# Patient Record
Sex: FEMALE | Race: BLACK OR AFRICAN AMERICAN | NOT HISPANIC OR LATINO | Employment: OTHER | URBAN - METROPOLITAN AREA
[De-identification: names, ages, dates, MRNs, and addresses within clinical notes are randomized per-mention and may not be internally consistent; named-entity substitution may affect disease eponyms.]

---

## 2023-02-09 ENCOUNTER — HOSPITAL ENCOUNTER (OUTPATIENT)
Dept: RADIOLOGY | Facility: HOSPITAL | Age: 43
Discharge: HOME | End: 2023-02-09
Attending: FAMILY MEDICINE
Payer: MEDICARE

## 2023-02-09 DIAGNOSIS — Z12.31 ENCOUNTER FOR SCREENING MAMMOGRAM FOR BREAST CANCER: ICD-10-CM

## 2023-02-09 PROCEDURE — 77067 SCR MAMMO BI INCL CAD: CPT | Mod: TC

## 2023-02-09 PROCEDURE — 77063 MAMMO DIGITAL SCREENING BILAT WITH TOMO: ICD-10-PCS | Mod: 26,,, | Performed by: STUDENT IN AN ORGANIZED HEALTH CARE EDUCATION/TRAINING PROGRAM

## 2023-02-09 PROCEDURE — 77067 SCR MAMMO BI INCL CAD: CPT | Mod: 26,,, | Performed by: STUDENT IN AN ORGANIZED HEALTH CARE EDUCATION/TRAINING PROGRAM

## 2023-02-09 PROCEDURE — 77067 MAMMO DIGITAL SCREENING BILAT WITH TOMO: ICD-10-PCS | Mod: 26,,, | Performed by: STUDENT IN AN ORGANIZED HEALTH CARE EDUCATION/TRAINING PROGRAM

## 2023-02-09 PROCEDURE — 77063 BREAST TOMOSYNTHESIS BI: CPT | Mod: 26,,, | Performed by: STUDENT IN AN ORGANIZED HEALTH CARE EDUCATION/TRAINING PROGRAM

## 2024-01-30 DIAGNOSIS — Z12.31 ENCOUNTER FOR SCREENING MAMMOGRAM FOR BREAST CANCER: Primary | ICD-10-CM

## 2024-03-28 ENCOUNTER — HOSPITAL ENCOUNTER (OUTPATIENT)
Dept: RADIOLOGY | Facility: HOSPITAL | Age: 44
Discharge: HOME | End: 2024-03-28
Attending: FAMILY MEDICINE
Payer: MEDICARE

## 2024-03-28 DIAGNOSIS — Z12.31 ENCOUNTER FOR SCREENING MAMMOGRAM FOR BREAST CANCER: ICD-10-CM

## 2024-03-28 PROCEDURE — 77067 SCR MAMMO BI INCL CAD: CPT | Mod: TC

## 2024-03-28 PROCEDURE — 77067 SCR MAMMO BI INCL CAD: CPT | Mod: 26,,, | Performed by: STUDENT IN AN ORGANIZED HEALTH CARE EDUCATION/TRAINING PROGRAM

## 2024-03-28 PROCEDURE — 77063 BREAST TOMOSYNTHESIS BI: CPT | Mod: 26,,, | Performed by: STUDENT IN AN ORGANIZED HEALTH CARE EDUCATION/TRAINING PROGRAM

## 2024-08-16 ENCOUNTER — HOSPITAL ENCOUNTER (OUTPATIENT)
Dept: RADIOLOGY | Facility: HOSPITAL | Age: 44
Discharge: HOME | End: 2024-08-16
Attending: FAMILY MEDICINE
Payer: MEDICARE

## 2024-08-16 DIAGNOSIS — R41.82 ALTERED MENTAL STATUS: ICD-10-CM

## 2024-08-16 PROCEDURE — 71046 X-RAY EXAM CHEST 2 VIEWS: CPT | Mod: TC

## 2024-10-01 ENCOUNTER — HOSPITAL ENCOUNTER (EMERGENCY)
Facility: HOSPITAL | Age: 44
Discharge: PSYCHIATRIC OR SUBSTANCE USE FACILITY | End: 2024-10-02
Attending: STUDENT IN AN ORGANIZED HEALTH CARE EDUCATION/TRAINING PROGRAM
Payer: MEDICARE

## 2024-10-01 DIAGNOSIS — D72.829 LEUKOCYTOSIS: ICD-10-CM

## 2024-10-01 DIAGNOSIS — F29 PSYCHOSIS, UNSPECIFIED PSYCHOSIS TYPE: ICD-10-CM

## 2024-10-01 DIAGNOSIS — Z00.8 MEDICAL CLEARANCE FOR PSYCHIATRIC ADMISSION: Primary | ICD-10-CM

## 2024-10-01 LAB
ALBUMIN SERPL-MCNC: 3.1 G/DL (ref 3.5–5)
ALBUMIN/GLOB SERPL: 0.7 RATIO (ref 1.1–2)
ALP SERPL-CCNC: 126 UNIT/L (ref 40–150)
ALT SERPL-CCNC: 19 UNIT/L (ref 0–55)
AMPHET UR QL SCN: NEGATIVE
ANION GAP SERPL CALC-SCNC: 8 MEQ/L
APAP SERPL-MCNC: <3 UG/ML (ref 10–30)
AST SERPL-CCNC: 22 UNIT/L (ref 5–34)
B-HCG UR QL: NEGATIVE
BACTERIA #/AREA URNS AUTO: ABNORMAL /HPF
BARBITURATE SCN PRESENT UR: NEGATIVE
BASOPHILS # BLD AUTO: 0.1 X10(3)/MCL
BASOPHILS NFR BLD AUTO: 0.6 %
BENZODIAZ UR QL SCN: NEGATIVE
BILIRUB SERPL-MCNC: 0.2 MG/DL
BILIRUB UR QL STRIP.AUTO: NEGATIVE
BUN SERPL-MCNC: 9 MG/DL (ref 7–18.7)
CALCIUM SERPL-MCNC: 9.1 MG/DL (ref 8.4–10.2)
CANNABINOIDS UR QL SCN: NEGATIVE
CHLORIDE SERPL-SCNC: 104 MMOL/L (ref 98–107)
CLARITY UR: ABNORMAL
CO2 SERPL-SCNC: 25 MMOL/L (ref 22–29)
COCAINE UR QL SCN: NEGATIVE
COLOR UR AUTO: ABNORMAL
CREAT SERPL-MCNC: 0.8 MG/DL (ref 0.55–1.02)
CREAT/UREA NIT SERPL: 11
EOSINOPHIL # BLD AUTO: 0.42 X10(3)/MCL (ref 0–0.9)
EOSINOPHIL NFR BLD AUTO: 2.4 %
ERYTHROCYTE [DISTWIDTH] IN BLOOD BY AUTOMATED COUNT: 17.7 % (ref 11.5–17)
ETHANOL SERPL-MCNC: <10 MG/DL
FENTANYL UR QL SCN: POSITIVE
GFR SERPLBLD CREATININE-BSD FMLA CKD-EPI: >60 ML/MIN/1.73/M2
GLOBULIN SER-MCNC: 4.6 GM/DL (ref 2.4–3.5)
GLUCOSE SERPL-MCNC: 106 MG/DL (ref 74–100)
GLUCOSE UR QL STRIP: NEGATIVE
HCT VFR BLD AUTO: 34.4 % (ref 37–47)
HGB BLD-MCNC: 11.1 G/DL (ref 12–16)
HGB UR QL STRIP: ABNORMAL
IMM GRANULOCYTES # BLD AUTO: 0.09 X10(3)/MCL (ref 0–0.04)
IMM GRANULOCYTES NFR BLD AUTO: 0.5 %
KETONES UR QL STRIP: NEGATIVE
LEUKOCYTE ESTERASE UR QL STRIP: NEGATIVE
LYMPHOCYTES # BLD AUTO: 4.49 X10(3)/MCL (ref 0.6–4.6)
LYMPHOCYTES NFR BLD AUTO: 25.8 %
MCH RBC QN AUTO: 26.7 PG (ref 27–31)
MCHC RBC AUTO-ENTMCNC: 32.3 G/DL (ref 33–36)
MCV RBC AUTO: 82.7 FL (ref 80–94)
MDMA UR QL SCN: NEGATIVE
MONOCYTES # BLD AUTO: 0.95 X10(3)/MCL (ref 0.1–1.3)
MONOCYTES NFR BLD AUTO: 5.5 %
NEUTROPHILS # BLD AUTO: 11.32 X10(3)/MCL (ref 2.1–9.2)
NEUTROPHILS NFR BLD AUTO: 65.2 %
NITRITE UR QL STRIP: NEGATIVE
OPIATES UR QL SCN: NEGATIVE
PCP UR QL: NEGATIVE
PH UR STRIP: 5.5 [PH]
PH UR: 5.5 [PH] (ref 3–11)
PLATELET # BLD AUTO: 441 X10(3)/MCL (ref 130–400)
PMV BLD AUTO: 10.3 FL (ref 7.4–10.4)
POTASSIUM SERPL-SCNC: 3.7 MMOL/L (ref 3.5–5.1)
PROT SERPL-MCNC: 7.7 GM/DL (ref 6.4–8.3)
PROT UR QL STRIP: ABNORMAL
RBC # BLD AUTO: 4.16 X10(6)/MCL (ref 4.2–5.4)
RBC #/AREA URNS AUTO: ABNORMAL /HPF
SODIUM SERPL-SCNC: 137 MMOL/L (ref 136–145)
SP GR UR STRIP.AUTO: >=1.03 (ref 1–1.03)
SPECIFIC GRAVITY, URINE AUTO (.000) (OHS): >=1.03 (ref 1–1.03)
SQUAMOUS #/AREA URNS AUTO: ABNORMAL /HPF
TSH SERPL-ACNC: 4.34 UIU/ML (ref 0.35–4.94)
UROBILINOGEN UR STRIP-ACNC: 0.2
WBC # BLD AUTO: 17.37 X10(3)/MCL (ref 4.5–11.5)
WBC #/AREA URNS AUTO: ABNORMAL /HPF

## 2024-10-01 PROCEDURE — 82077 ASSAY SPEC XCP UR&BREATH IA: CPT | Performed by: STUDENT IN AN ORGANIZED HEALTH CARE EDUCATION/TRAINING PROGRAM

## 2024-10-01 PROCEDURE — 99285 EMERGENCY DEPT VISIT HI MDM: CPT | Mod: 25

## 2024-10-01 PROCEDURE — 81003 URINALYSIS AUTO W/O SCOPE: CPT | Performed by: STUDENT IN AN ORGANIZED HEALTH CARE EDUCATION/TRAINING PROGRAM

## 2024-10-01 PROCEDURE — 80307 DRUG TEST PRSMV CHEM ANLYZR: CPT | Performed by: STUDENT IN AN ORGANIZED HEALTH CARE EDUCATION/TRAINING PROGRAM

## 2024-10-01 PROCEDURE — 81001 URINALYSIS AUTO W/SCOPE: CPT | Mod: XB | Performed by: STUDENT IN AN ORGANIZED HEALTH CARE EDUCATION/TRAINING PROGRAM

## 2024-10-01 PROCEDURE — 81025 URINE PREGNANCY TEST: CPT | Performed by: STUDENT IN AN ORGANIZED HEALTH CARE EDUCATION/TRAINING PROGRAM

## 2024-10-01 PROCEDURE — 80143 DRUG ASSAY ACETAMINOPHEN: CPT | Performed by: STUDENT IN AN ORGANIZED HEALTH CARE EDUCATION/TRAINING PROGRAM

## 2024-10-01 PROCEDURE — 84443 ASSAY THYROID STIM HORMONE: CPT | Performed by: STUDENT IN AN ORGANIZED HEALTH CARE EDUCATION/TRAINING PROGRAM

## 2024-10-01 PROCEDURE — 85025 COMPLETE CBC W/AUTO DIFF WBC: CPT | Performed by: STUDENT IN AN ORGANIZED HEALTH CARE EDUCATION/TRAINING PROGRAM

## 2024-10-01 PROCEDURE — 80053 COMPREHEN METABOLIC PANEL: CPT | Performed by: STUDENT IN AN ORGANIZED HEALTH CARE EDUCATION/TRAINING PROGRAM

## 2024-10-01 RX ORDER — HYDROCHLOROTHIAZIDE 25 MG/1
25 TABLET ORAL
COMMUNITY
Start: 2024-07-26

## 2024-10-01 RX ORDER — TRAZODONE HYDROCHLORIDE 150 MG/1
300 TABLET ORAL NIGHTLY
COMMUNITY
Start: 2024-08-15

## 2024-10-01 RX ORDER — CARIPRAZINE 1.5 MG/1
1.5 CAPSULE, GELATIN COATED ORAL DAILY
COMMUNITY
Start: 2024-09-13

## 2024-10-01 RX ORDER — PRAVASTATIN SODIUM 20 MG/1
20 TABLET ORAL NIGHTLY
COMMUNITY
Start: 2024-07-02

## 2024-10-01 RX ORDER — ZIPRASIDONE HYDROCHLORIDE 60 MG/1
140 CAPSULE ORAL NIGHTLY
COMMUNITY
Start: 2024-08-02

## 2024-10-01 RX ORDER — SERTRALINE HYDROCHLORIDE 100 MG/1
200 TABLET, FILM COATED ORAL EVERY MORNING
COMMUNITY
Start: 2024-08-02

## 2024-10-01 NOTE — ED PROVIDER NOTES
Encounter Date: 10/1/2024       History     Chief Complaint   Patient presents with    Mental Health Problem     Needs medication adjustment, talking to herself, leaving the house when she's isn't supposed to. Pt is not acting herself, Dr. Brannon suggest PEC      HPI     44-year-old female with a past medical history developmental delay and schizophrenia presents emergency department for increasing bizarre behavior, running away from home going into abandoned structures and not taking her medication. family concerned that she will end up getting hurt.  PCP informed him to bring her to the emergency department for pec    Review of patient's allergies indicates:   Allergen Reactions    Hydrocodone-acetaminophen      Past Medical History:   Diagnosis Date    Bipolar disorder, unspecified     Depression     Hypercholesteremia     Schizophrenia, unspecified      History reviewed. No pertinent surgical history.  No family history on file.  Social History     Tobacco Use    Smoking status: Never    Smokeless tobacco: Never   Substance Use Topics    Alcohol use: Never    Drug use: Never     Review of Systems   Unable to perform ROS: Psychiatric disorder       Physical Exam     Initial Vitals [10/01/24 1816]   BP Pulse Resp Temp SpO2   (!) 145/85 96 18 96.4 °F (35.8 °C) (!) 94 %      MAP       --         Physical Exam    Nursing note and vitals reviewed.  Constitutional: She appears well-developed and well-nourished. She is Obese . No distress.   Cardiovascular:  Normal rate and regular rhythm.           Pulmonary/Chest: Breath sounds normal. No respiratory distress. She has no wheezes. She has no rhonchi. She has no rales.   Abdominal: Abdomen is soft. There is no abdominal tenderness. There is no rebound and no guarding.   Musculoskeletal:         General: No tenderness. Normal range of motion.     Neurological: She is alert and oriented to person, place, and time. She has normal strength.   Skin: Skin is warm. Capillary  refill takes less than 2 seconds.   Psychiatric: Her speech is tangential. She is hyperactive and actively hallucinating. She expresses impulsivity. She is inattentive.         ED Course   Procedures  Labs Reviewed   COMPREHENSIVE METABOLIC PANEL - Abnormal       Result Value    Sodium 137      Potassium 3.7      Chloride 104      CO2 25      Glucose 106 (*)     Blood Urea Nitrogen 9.0      Creatinine 0.80      Calcium 9.1      Protein Total 7.7      Albumin 3.1 (*)     Globulin 4.6 (*)     Albumin/Globulin Ratio 0.7 (*)     Bilirubin Total 0.2            ALT 19      AST 22      eGFR >60      Anion Gap 8.0      BUN/Creatinine Ratio 11     ACETAMINOPHEN LEVEL - Abnormal    Acetaminophen Level <3.0 (*)    CBC WITH DIFFERENTIAL - Abnormal    WBC 17.37 (*)     RBC 4.16 (*)     Hgb 11.1 (*)     Hct 34.4 (*)     MCV 82.7      MCH 26.7 (*)     MCHC 32.3 (*)     RDW 17.7 (*)     Platelet 441 (*)     MPV 10.3      Neut % 65.2      Lymph % 25.8      Mono % 5.5      Eos % 2.4      Basophil % 0.6      Lymph # 4.49      Neut # 11.32 (*)     Mono # 0.95      Eos # 0.42      Baso # 0.10      IG# 0.09 (*)     IG% 0.5     TSH - Normal    TSH 4.337     ALCOHOL,MEDICAL (ETHANOL) - Normal    Ethanol Level <10.0     CBC W/ AUTO DIFFERENTIAL    Narrative:     The following orders were created for panel order CBC auto differential.  Procedure                               Abnormality         Status                     ---------                               -----------         ------                     CBC with Differential[[id number]]       Abnormal            Final result                 Please view results for these tests on the individual orders.   URINALYSIS, REFLEX TO URINE CULTURE   DRUG SCREEN, URINE (BEAKER)   PREGNANCY TEST, URINE RAPID          Imaging Results              X-Ray Chest 1 View (Preliminary result)  Result time 10/01/24 19:44:18      Wet Read by Colt Cavazos MD (10/01/24 19:44:18, [facility] Emergency Dept, Emergency Medicine)    Lungs clear.  Poor inspiratory effort                                     Medications - No data to display  Medical Decision Making  Initial Assessment:       Psychiatric screening exam      Differential Diagnosis:   Judging by the patient's chief complaint and pertinent history, the patient has the following possible differential diagnoses, including but not limited to the following.  Some of these are deemed to be lower likelihood and some more likely based on my physical exam and history combined with possible lab work and/or imaging studies.   Please see the pertinent studies, and refer to the HPI.  Some of these diagnoses will take further evaluation to fully rule out, perhaps as an outpatient and the patient was encouraged to follow up when discharged for more comprehensive evaluation.      Psychiatric condition, medication noncompliance, psychosis, UTI,  as well as multiple other possible etiologies      Problems Addressed:  Psychosis, unspecified psychosis type: acute illness or injury    Amount and/or Complexity of Data Reviewed  Independent Historian: caregiver  Labs: ordered. Decision-making details documented in ED Course.  Radiology: ordered and independent interpretation performed.               ED Course as of 10/01/24 1945   Tue Oct 01, 2024   1858 WBC(!): 17.37  No obvious signs or symptoms of infection.  Urinalysis pending.  Will get a chest x-ray [BS]   1858 Hemoglobin(!): 11.1 [BS]   1858 Hematocrit(!): 34.4 [BS]   1858 Platelet Count(!): 441 [BS]   1933 Comprehensive metabolic panel(!)  Reassuring [BS]      ED Course User Index  [BS] Colt Cavazos MD       Medically cleared for psychiatry placement: 10/1/2024  7:44 PM                   Clinical Impression:  Final diagnoses:  [D72.829] Leukocytosis  [Z00.8] Medical clearance for psychiatric admission (Primary)  [F29] Psychosis, unspecified psychosis type          ED Disposition Condition     Transfer to Psychiatric Facility Stable          ED Prescriptions    None       Follow-up Information    None          Colt Cavazos MD  10/01/24 5722

## 2024-10-02 VITALS
DIASTOLIC BLOOD PRESSURE: 89 MMHG | OXYGEN SATURATION: 96 % | HEART RATE: 88 BPM | WEIGHT: 274 LBS | RESPIRATION RATE: 18 BRPM | SYSTOLIC BLOOD PRESSURE: 142 MMHG | TEMPERATURE: 97 F | BODY MASS INDEX: 45.65 KG/M2 | HEIGHT: 65 IN

## 2025-03-28 DIAGNOSIS — R10.84 GENERALIZED ABDOMINAL PAIN: Primary | ICD-10-CM

## 2025-03-31 ENCOUNTER — HOSPITAL ENCOUNTER (INPATIENT)
Facility: HOSPITAL | Age: 45
LOS: 4 days | Discharge: HOME | DRG: 418 | End: 2025-04-04
Attending: STUDENT IN AN ORGANIZED HEALTH CARE EDUCATION/TRAINING PROGRAM | Admitting: INTERNAL MEDICINE
Payer: MEDICARE

## 2025-03-31 ENCOUNTER — HOSPITAL ENCOUNTER (OUTPATIENT)
Dept: RADIOLOGY | Facility: HOSPITAL | Age: 45
Discharge: HOME | End: 2025-03-31
Attending: FAMILY MEDICINE
Payer: MEDICARE

## 2025-03-31 DIAGNOSIS — R07.9 CHEST PAIN: ICD-10-CM

## 2025-03-31 DIAGNOSIS — R74.01 TRANSAMINITIS: Primary | ICD-10-CM

## 2025-03-31 DIAGNOSIS — K80.50 CHOLEDOCHOLITHIASIS: ICD-10-CM

## 2025-03-31 DIAGNOSIS — Z12.31 ENCOUNTER FOR SCREENING MAMMOGRAM FOR BREAST CANCER: ICD-10-CM

## 2025-03-31 DIAGNOSIS — R10.84 GENERALIZED ABDOMINAL PAIN: ICD-10-CM

## 2025-03-31 LAB
ALBUMIN SERPL-MCNC: 3 G/DL (ref 3.5–5)
ALBUMIN/GLOB SERPL: 0.7 RATIO (ref 1.1–2)
ALP SERPL-CCNC: 655 UNIT/L (ref 40–150)
ALT SERPL-CCNC: 289 UNIT/L (ref 0–55)
ANION GAP SERPL CALC-SCNC: 12 MEQ/L
AST SERPL-CCNC: 258 UNIT/L (ref 11–45)
BASOPHILS # BLD AUTO: 0.1 X10(3)/MCL
BASOPHILS NFR BLD AUTO: 0.7 %
BILIRUB DIRECT SERPL-MCNC: 0.9 MG/DL (ref 0–?)
BILIRUB SERPL-MCNC: 1.3 MG/DL
BUN SERPL-MCNC: 9 MG/DL (ref 7–18.7)
CALCIUM SERPL-MCNC: 9.2 MG/DL (ref 8.4–10.2)
CHLORIDE SERPL-SCNC: 99 MMOL/L (ref 98–107)
CO2 SERPL-SCNC: 25 MMOL/L (ref 22–29)
CREAT SERPL-MCNC: 0.72 MG/DL (ref 0.55–1.02)
CREAT SERPL-MCNC: 0.8 MG/DL (ref 0.5–1.4)
CREAT/UREA NIT SERPL: 13
EOSINOPHIL # BLD AUTO: 0.62 X10(3)/MCL (ref 0–0.9)
EOSINOPHIL NFR BLD AUTO: 4.5 %
ERYTHROCYTE [DISTWIDTH] IN BLOOD BY AUTOMATED COUNT: 22.3 % (ref 11.5–17)
GFR SERPLBLD CREATININE-BSD FMLA CKD-EPI: >60 ML/MIN/1.73/M2
GLOBULIN SER-MCNC: 4.2 GM/DL (ref 2.4–3.5)
GLUCOSE SERPL-MCNC: 102 MG/DL (ref 74–100)
HCT VFR BLD AUTO: 36.5 % (ref 37–47)
HGB BLD-MCNC: 11.8 G/DL (ref 12–16)
IMM GRANULOCYTES # BLD AUTO: 0.05 X10(3)/MCL (ref 0–0.04)
IMM GRANULOCYTES NFR BLD AUTO: 0.4 %
LYMPHOCYTES # BLD AUTO: 4.82 X10(3)/MCL (ref 0.6–4.6)
LYMPHOCYTES NFR BLD AUTO: 34.7 %
MACROCYTES BLD QL SMEAR: ABNORMAL
MCH RBC QN AUTO: 26 PG (ref 27–31)
MCHC RBC AUTO-ENTMCNC: 32.3 G/DL (ref 33–36)
MCV RBC AUTO: 80.4 FL (ref 80–94)
MONOCYTES # BLD AUTO: 0.74 X10(3)/MCL (ref 0.1–1.3)
MONOCYTES NFR BLD AUTO: 5.3 %
NEUTROPHILS # BLD AUTO: 7.57 X10(3)/MCL (ref 2.1–9.2)
NEUTROPHILS NFR BLD AUTO: 54.4 %
NRBC BLD AUTO-RTO: 0 %
PLATELET # BLD AUTO: 434 X10(3)/MCL (ref 130–400)
PLATELET # BLD EST: ABNORMAL 10*3/UL
PMV BLD AUTO: 10.7 FL (ref 7.4–10.4)
POTASSIUM SERPL-SCNC: 3.5 MMOL/L (ref 3.5–5.1)
PROT SERPL-MCNC: 7.2 GM/DL (ref 6.4–8.3)
RBC # BLD AUTO: 4.54 X10(6)/MCL (ref 4.2–5.4)
RBC MORPH BLD: ABNORMAL
SAMPLE: NORMAL
SODIUM SERPL-SCNC: 136 MMOL/L (ref 136–145)
TARGETS BLD QL SMEAR: ABNORMAL
WBC # BLD AUTO: 13.9 X10(3)/MCL (ref 4.5–11.5)

## 2025-03-31 PROCEDURE — 77067 SCR MAMMO BI INCL CAD: CPT | Mod: TC

## 2025-03-31 PROCEDURE — 25000003 PHARM REV CODE 250: Performed by: STUDENT IN AN ORGANIZED HEALTH CARE EDUCATION/TRAINING PROGRAM

## 2025-03-31 PROCEDURE — 80053 COMPREHEN METABOLIC PANEL: CPT

## 2025-03-31 PROCEDURE — 63600175 PHARM REV CODE 636 W HCPCS: Performed by: STUDENT IN AN ORGANIZED HEALTH CARE EDUCATION/TRAINING PROGRAM

## 2025-03-31 PROCEDURE — 77067 SCR MAMMO BI INCL CAD: CPT | Mod: 26,,, | Performed by: STUDENT IN AN ORGANIZED HEALTH CARE EDUCATION/TRAINING PROGRAM

## 2025-03-31 PROCEDURE — 11000001 HC ACUTE MED/SURG PRIVATE ROOM

## 2025-03-31 PROCEDURE — 74178 CT ABD&PLV WO CNTR FLWD CNTR: CPT | Mod: TC

## 2025-03-31 PROCEDURE — 77063 BREAST TOMOSYNTHESIS BI: CPT | Mod: 26,,, | Performed by: STUDENT IN AN ORGANIZED HEALTH CARE EDUCATION/TRAINING PROGRAM

## 2025-03-31 PROCEDURE — 82248 BILIRUBIN DIRECT: CPT | Performed by: PHYSICIAN ASSISTANT

## 2025-03-31 PROCEDURE — 85025 COMPLETE CBC W/AUTO DIFF WBC: CPT

## 2025-03-31 PROCEDURE — 99223 1ST HOSP IP/OBS HIGH 75: CPT | Mod: GC,,, | Performed by: SURGERY

## 2025-03-31 PROCEDURE — 25500020 PHARM REV CODE 255: Performed by: FAMILY MEDICINE

## 2025-03-31 PROCEDURE — 99285 EMERGENCY DEPT VISIT HI MDM: CPT | Mod: 25

## 2025-03-31 RX ORDER — ENOXAPARIN SODIUM 100 MG/ML
40 INJECTION SUBCUTANEOUS EVERY 24 HOURS
Status: DISCONTINUED | OUTPATIENT
Start: 2025-04-01 | End: 2025-04-01

## 2025-03-31 RX ORDER — FLUOXETINE HYDROCHLORIDE 20 MG/1
20 CAPSULE ORAL DAILY
COMMUNITY
Start: 2025-03-26

## 2025-03-31 RX ORDER — IOPAMIDOL 755 MG/ML
100 INJECTION, SOLUTION INTRAVASCULAR
Status: COMPLETED | OUTPATIENT
Start: 2025-03-31 | End: 2025-03-31

## 2025-03-31 RX ORDER — ONDANSETRON HYDROCHLORIDE 2 MG/ML
4 INJECTION, SOLUTION INTRAVENOUS EVERY 4 HOURS PRN
Status: DISCONTINUED | OUTPATIENT
Start: 2025-03-31 | End: 2025-04-04 | Stop reason: HOSPADM

## 2025-03-31 RX ORDER — ALUMINUM HYDROXIDE, MAGNESIUM HYDROXIDE, AND SIMETHICONE 1200; 120; 1200 MG/30ML; MG/30ML; MG/30ML
30 SUSPENSION ORAL 4 TIMES DAILY PRN
Status: DISCONTINUED | OUTPATIENT
Start: 2025-03-31 | End: 2025-04-04 | Stop reason: HOSPADM

## 2025-03-31 RX ORDER — SODIUM CHLORIDE 0.9 % (FLUSH) 0.9 %
10 SYRINGE (ML) INJECTION
Status: DISCONTINUED | OUTPATIENT
Start: 2025-03-31 | End: 2025-04-04 | Stop reason: HOSPADM

## 2025-03-31 RX ORDER — PROCHLORPERAZINE EDISYLATE 5 MG/ML
5 INJECTION INTRAMUSCULAR; INTRAVENOUS EVERY 6 HOURS PRN
Status: DISCONTINUED | OUTPATIENT
Start: 2025-03-31 | End: 2025-04-04 | Stop reason: HOSPADM

## 2025-03-31 RX ORDER — PALIPERIDONE 6 MG/1
3 TABLET, EXTENDED RELEASE ORAL DAILY
COMMUNITY
End: 2025-03-31 | Stop reason: CLARIF

## 2025-03-31 RX ORDER — OLANZAPINE 5 MG/1
15 TABLET ORAL NIGHTLY
Status: DISCONTINUED | OUTPATIENT
Start: 2025-04-01 | End: 2025-04-04 | Stop reason: HOSPADM

## 2025-03-31 RX ORDER — NALOXONE HCL 0.4 MG/ML
0.02 VIAL (ML) INJECTION
Status: DISCONTINUED | OUTPATIENT
Start: 2025-03-31 | End: 2025-04-04 | Stop reason: HOSPADM

## 2025-03-31 RX ORDER — IBUPROFEN 200 MG
16 TABLET ORAL
Status: DISCONTINUED | OUTPATIENT
Start: 2025-03-31 | End: 2025-04-04 | Stop reason: HOSPADM

## 2025-03-31 RX ORDER — GLUCAGON 1 MG
1 KIT INJECTION
Status: DISCONTINUED | OUTPATIENT
Start: 2025-03-31 | End: 2025-04-04 | Stop reason: HOSPADM

## 2025-03-31 RX ORDER — IBUPROFEN 200 MG
24 TABLET ORAL
Status: DISCONTINUED | OUTPATIENT
Start: 2025-03-31 | End: 2025-04-04 | Stop reason: HOSPADM

## 2025-03-31 RX ORDER — FLUOXETINE HYDROCHLORIDE 20 MG/1
20 CAPSULE ORAL DAILY
Status: DISCONTINUED | OUTPATIENT
Start: 2025-04-01 | End: 2025-04-04 | Stop reason: HOSPADM

## 2025-03-31 RX ORDER — TALC
6 POWDER (GRAM) TOPICAL NIGHTLY PRN
Status: DISCONTINUED | OUTPATIENT
Start: 2025-03-31 | End: 2025-04-04 | Stop reason: HOSPADM

## 2025-03-31 RX ORDER — OLANZAPINE 15 MG/1
20 TABLET ORAL NIGHTLY
COMMUNITY
Start: 2025-03-13

## 2025-03-31 RX ORDER — ACETAMINOPHEN 500 MG
1000 TABLET ORAL EVERY 6 HOURS PRN
Status: DISCONTINUED | OUTPATIENT
Start: 2025-03-31 | End: 2025-04-04 | Stop reason: HOSPADM

## 2025-03-31 RX ORDER — BISACODYL 10 MG/1
10 SUPPOSITORY RECTAL DAILY PRN
Status: DISCONTINUED | OUTPATIENT
Start: 2025-03-31 | End: 2025-04-04 | Stop reason: HOSPADM

## 2025-03-31 RX ORDER — AMOXICILLIN 250 MG
1 CAPSULE ORAL 2 TIMES DAILY PRN
Status: DISCONTINUED | OUTPATIENT
Start: 2025-03-31 | End: 2025-04-04 | Stop reason: HOSPADM

## 2025-03-31 RX ADMIN — PIPERACILLIN AND TAZOBACTAM 4.5 G: 4; .5 INJECTION, POWDER, LYOPHILIZED, FOR SOLUTION INTRAVENOUS; PARENTERAL at 06:03

## 2025-03-31 RX ADMIN — IOPAMIDOL 100 ML: 755 INJECTION, SOLUTION INTRAVENOUS at 11:03

## 2025-03-31 NOTE — FIRST PROVIDER EVALUATION
"Medical screening examination initiated.  I have conducted a focused provider triage encounter, findings are as follows:    Brief history of present illness:  44 year old female sent to ER by Dr. Brannon due to elevated lfts and cholelithiasis. Patient has CT scan today showing cholelithiasis, 2-3 mm stones at the distal common bile duct. Denies  abdominal pain, nausea, vomiting, or diarrhea     Vitals:    03/31/25 1526   BP: (!) 140/93   BP Location: Left arm   Pulse: 74   Resp: 20   Temp: 98.2 °F (36.8 °C)   TempSrc: Oral   SpO2: 99%   Weight: 109.3 kg (241 lb)   Height: 5' 5" (1.651 m)       Pertinent physical exam:  awake and alert, nad    Brief workup plan:  labs, ua    Preliminary workup initiated; this workup will be continued and followed by the physician or advanced practice provider that is assigned to the patient when roomed.  "

## 2025-03-31 NOTE — H&P
"[facility] Hospital Medicine - H&P Note    Patient Name: Martine Nina  : 1980  MRN: [id number]  PCP: Mateo Brannon MD  Admitting Physician:  JUAN MANUEL Dumont MD  Admission Class: IP- Inpatient   Code status: Full    Allergies   Hydrocodone-acetaminophen    Chief Complaint   Abdominal pain     History of Present Illness   44 yr old female whose history includes Bipolar disorder, schizophrenia and dyslipidemia. At the time of my exam patient is AAO x 3. Appears comfortable. Her personal home sitter at bedside. Reported having abdominal pain 2 weeks ago which lasted about a week prior to resolving but returned again yesterday. Outpatient labs and CT abdomen/pelvis ordered by PCP which revealed a significant transaminitis and stones in CBD. She was instructed to present to ED for further evaluation. Endorses vomiting after eating a "heavy" meal and a 17 pound weight loss since 3/6/25.    CT abdomen and pelvis with and without contrast showed mild hepatomegaly with dilated intrahepatic and extrahepatic biliary ducts with suspected 2-3 mm stones of the distal common bile duct/pancreatic head, cholelithiasis, moderate-size hiatal hernia with mucosal thickening and findings of constipation with dense feces at the left-sided the colon including a dense fecal bolus at the rectum measuring up to 7 cm in diameter.    VS on arrival: T 98.2, P 74, R 20, B/P 140/93, sats 99% on room air. Initial labs include WBC 13.9 (appears chronic), , , .  Abdominal ultrasound shows cholelithiasis with a positive sonographic Pearl's sign but no significant ascites or gallbladder wall thickening.  Common bile duct not confidently visualized on this exam.  Mild hepatomegaly.    ROS   Except as documented, all other systems reviewed and negative     Past Medical History   Bipolar disorder  Schizophrenia  Depression  Dyslipidemia  Obesity     Past Surgical History   Denies     Social " "History   Denies alcohol, tobacco or illicit drug use. Lives at home alone and has a personal caregiver 15 hours a day. Ambulatory and independent of ADLs.     Screening for Social Drivers for health:  Patient screened for food insecurity, housing instability, transportation needs, utility difficulties, and interpersonal safety (select all that apply as identified as concern)  []Housing or Food  []Transportation Needs  []Utility Difficulties  []Interpersonal safety  [x]None        Family History   Reviewed and negative    Home Medications     Prior to Admission medications    Medication Sig Start Date End Date Taking? Authorizing Provider   FLUoxetine 20 MG capsule Take 20 mg by mouth once daily. 3/26/25  Yes Provider, Historical   hydroCHLOROthiazide (HYDRODIURIL) 25 MG tablet Take 25 mg by mouth daily 7/26/24  Yes Provider, Historical   OLANZapine (ZYPREXA) 15 MG Tab Take 15 mg by mouth every evening. 3/13/25  Yes Provider, Historical   pravastatin (PRAVACHOL) 20 MG tablet Take 20 mg by mouth every evening. 7/2/24  Yes Provider, Historical                                        Physical Exam   Vital Signs  Temp:  [98.2 °F (36.8 °C)]   Pulse:  [74]   Resp:  [20]   BP: (140)/(93)   SpO2:  [99 %]    General: Appears comfortable  HEENT: NC/AT  Neck:  No JVD  Chest: CTABL  CVS: Regular rhythm. Normal S1/S2.  Abdomen: nondistended, normoactive BS, soft and non-tender.  MSK: No obvious deformity or joint swelling  Skin: Warm and dry  Neuro: AAOx3, no focal neurological deficit  Psych: Cooperative    Labs     Recent Labs     03/31/25  1619   WBC 13.90*   RBC 4.54   HGB 11.8*   HCT 36.5*   MCV 80.4   MCH 26.0*   MCHC 32.3*   RDW 22.3*   *     No results for input(s): "PROTIME", "INR", "PTT", "D-DIMER", "FERRITIN", "IRON", "TRANS", "TIBC", "LABIRON", "IMZMBKRK64", "FOLATE", "LDH", "HAPTOGLOBIN", "RETICCNTAUTO", "RETABS", "PERIPSMEAREV" in the last 72 hours.   Recent Labs     03/31/25  1619      K 3.5   CO2 25 " "  BUN 9.0   CREATININE 0.72   EGFRNORACEVR >60   GLUCOSE 102*   CALCIUM 9.2   ALBUMIN 3.0*   GLOBULIN 4.2*   ALKPHOS 655*   *   *   BILITOT 1.3   BILIDIR 0.9*     No results for input(s): "LACTIC" in the last 72 hours.  No results for input(s): "CPK", "TROPONINI" in the last 72 hours.     Microbiology Results (last 7 days)       ** No results found for the last 168 hours. **           Imaging   US Abdomen Limited  Narrative: EXAMINATION:  US ABDOMEN LIMITED    CLINICAL HISTORY:  Cholelithiasis;    TECHNIQUE:  Gray-scale and color Doppler ultrasound images of the abdomen.    COMPARISON:  CT 03/31/2025    FINDINGS:  Poor visualization of the pancreas and IVC.    Liver mildly enlarged.  Main portal vein patent with normal flow direction.  Common bile duct not confidently seen.  Cholelithiasis.  No gallbladder wall thickening or significant ascites.  Positive sonographic Pearl sign.    No right-sided hydronephrosis.    Measurements:    - Liver: 18.8 cm    - Right kidney: 7.5 cm in length  Impression: Cholelithiasis with positive sonographic Pearl sign, but no significant ascites or gallbladder wall thickening.    Common bile duct not confidently visualized on this exam.    Mild hepatomegaly.    Electronically signed by: Shailesh White  Date:    03/31/2025  Time:    16:57  CT Abdomen Pelvis W Wo Contrast  Narrative: EXAMINATION:  CT ABDOMEN PELVIS W WO CONTRAST    CLINICAL HISTORY:  R10.84;, Generalized abdominal pain.    TECHNIQUE:  PATIENT RADIATION DOSE: DLP(mGycm) 2250    As per PQRS measures, all CT scans at this facility used dose modulation, iterative reconstruction, and/or weight based dose adjustment when appropriate to reduce radiation dose to as low as reasonably achievable.    COMPARISON:  None available    FINDINGS:  Serial axial images were obtained through the abdomen and pelvis with and without the administration of  IV contrast. Coronal and sagittal reconstructions were also obtained. " Degenerative changes are noted to the thoracolumbar spine.  The heart is normal in size.  There is mucosal prominence at a moderate size hiatus hernia.  Lung bases demonstrate mild atelectasis and/or scarring.  The liver is mildly enlarged.  There is enlargement of the intrahepatic and extrahepatic biliary ducts.  There is a dilated common bile duct.  There are suspect the small 2-3 mm stones at the distal common bile duct/ pancreatic head.  Multiple small stones seen within the gallbladder.  The spleen, adrenal glands, and pancreas are grossly within normal limits.  The kidneys are relatively symmetric in size.  No hydronephrosis is seen.  Few small lymph nodes are seen within the periaortic and pericaval region.  No dilated loops of bowel are identified.  Feces is scattered throughout the majority of the colon.  The appendix is not identified with certainty.  There is dense feces within the left side of the colon a dense fecal bolus at the rectum.  The bladder is distended with fluid.  The uterus is grossly normal in size.  There is a suspect small follicle at the left ovary.  No free fluid collection is seen.  Impression: 1. Mild hepatomegaly with dilated intrahepatic and extrahepatic biliary ducts with suspect 2-3 mm stones at the distal common bile duct/pancreatic head.  MRCP examination would allow further evaluation  2. Cholelithiasis  3. Moderate size hiatus hernia with mucosal thickening  4. Findings of constipation with dense feces at the left side of the colon including a dense fecal bolus at the rectum measuring up the 7 cm in diameter  5. Findings and other details as above    Electronically signed by: Syed Parkinson  Date:    03/31/2025  Time:    13:39    Assessment & Plan     ?Choledocholithiasis   Abdominal pain   Schizophrenia/Bipolar disorder    - keep NPO for now  - GI consult  - repeat labs in AM   - UPT pending   - home psych resumed      VTE Prophylaxis: Lovenox      Sylvia NAJERA, FNP-BC  have discussed this patients case with Dr. Dumont who agrees with the diagnosis and treatment plan.      ________________________________________________________________________  I, Dr. Kurt Dumont assumed care of this patient.  For the patient encounter, I performed the substantive portion of the visit, I reviewed the NPPA documentation, treatment plan, and medical decision making.  I had face to face time with this patient.  I have personally reviewed the labs and test results that are presently available. I have reviewed or attempted to review medical records based upon their availability. If patient was admitted under observational status it is with my approval.      44-year-old female presented with abdominal pain, CT at prior facility shows a 2-3 mm stone at the distal common bile duct/pancreatic head, ultrasound here can not definitively determined choledocholithiasis and did not even visualize the common bile, we will get an MRCP.    Time seen:  11:00 p.m. 3/31  Critical care time: 35 min; Critical care diagnosis:  Kurt Dumont MD

## 2025-03-31 NOTE — Clinical Note
Diagnosis: Choledocholithiasis [804306]   Future Attending Provider: ADONIS ROSARIO [[id number]]   Admit to which facility:: [facility] [[id number]]   Reason for IP Medical Treatment  (Clinical interventions that can only be accomplished in the IP setting? ) :: GI, general surgeon   Plans for Post-Acute care--if anticipated (pick the single best option):: A. No post acute care anticipated at this time

## 2025-03-31 NOTE — ED PROVIDER NOTES
Encounter Date: 3/31/2025    SCRIBE #1 NOTE: I, Fabricio Aguilar, am scribing for, and in the presence of,  Kumar Pereira MD. I have scribed the following portions of the note - Other sections scribed: HPI, ROS, PE.       History     Chief Complaint   Patient presents with    Abnormal Lab     Pt sent to ED by PCP Dr. Brannon's office for elevated liver enzymes and CT abd/pelvis concerning for gallstones. Pt denies abd pain, n/v/d. Hx schizophrenia, bipolar.      Patient is a 44 y.o. female with a PMHx of bipolar disorder, schizophrenia, and HLD presenting to the ED with abnormal labs earlier today. Patient reports she was having abdominal pain 2 weeks ago that lasted a week but she denies any abdominal pain over the last week. Patient had routine lab work with her PCP today that revealed elevated liver enzymes and a following CT scan done that showed gallstones. Patient's PCP is Mateo Brannon MD. Patient is not on blood thinners. Patient has no current complaints and she presents with her caretaker who confirms her history.     The history is provided by the patient and a caregiver. No  was used.     Review of patient's allergies indicates:   Allergen Reactions    Hydrocodone-acetaminophen      Past Medical History:   Diagnosis Date    Bipolar disorder, unspecified     Depression     Hypercholesteremia     Schizophrenia, unspecified      No past surgical history on file.  No family history on file.  Social History[1]  Review of Systems   Gastrointestinal:  Positive for abdominal pain.       Physical Exam     Initial Vitals [03/31/25 1526]   BP Pulse Resp Temp SpO2   (!) 140/93 74 20 98.2 °F (36.8 °C) 99 %      MAP       --         Physical Exam    Nursing note and vitals reviewed.  Constitutional: She appears well-developed and well-nourished. She is not diaphoretic. No distress.   HENT:   Head: Normocephalic and atraumatic.   Right Ear: External ear normal.   Left Ear: External ear  normal.   Nose: Nose normal.   Eyes: Conjunctivae and EOM are normal. Pupils are equal, round, and reactive to light. Right eye exhibits no discharge. Left eye exhibits no discharge.   Cardiovascular:  Normal rate, regular rhythm, normal heart sounds and intact distal pulses.     Exam reveals no gallop and no friction rub.       No murmur heard.  Pulmonary/Chest: Breath sounds normal. No respiratory distress. She has no wheezes. She has no rhonchi. She has no rales. She exhibits no tenderness.   Abdominal: Abdomen is soft. Bowel sounds are normal. She exhibits no distension and no mass. There is no abdominal tenderness. There is no rebound and no guarding.   Musculoskeletal:         General: No edema. Normal range of motion.     Neurological: She is alert and oriented to person, place, and time. No cranial nerve deficit or sensory deficit.   Skin: Skin is warm and dry. Capillary refill takes less than 2 seconds. No erythema. No pallor.         ED Course   Procedures  Labs Reviewed   COMPREHENSIVE METABOLIC PANEL - Abnormal       Result Value    Sodium 136      Potassium 3.5      Chloride 99      CO2 25      Glucose 102 (*)     Blood Urea Nitrogen 9.0      Creatinine 0.72      Calcium 9.2      Protein Total 7.2      Albumin 3.0 (*)     Globulin 4.2 (*)     Albumin/Globulin Ratio 0.7 (*)     Bilirubin Total 1.3       (*)      (*)      (*)     eGFR >60      Anion Gap 12.0      BUN/Creatinine Ratio 13     CBC WITH DIFFERENTIAL - Abnormal    WBC 13.90 (*)     RBC 4.54      Hgb 11.8 (*)     Hct 36.5 (*)     MCV 80.4      MCH 26.0 (*)     MCHC 32.3 (*)     RDW 22.3 (*)     Platelet 434 (*)     MPV 10.7 (*)     Neut % 54.4      Lymph % 34.7      Mono % 5.3      Eos % 4.5      Basophil % 0.7      Imm Grans % 0.4      Neut # 7.57      Lymph # 4.82 (*)     Mono # 0.74      Eos # 0.62      Baso # 0.10      Imm Gran # 0.05 (*)     NRBC% 0.0     BILIRUBIN, DIRECT - Abnormal    Bilirubin Direct 0.9 (*)     BLOOD SMEAR MICROSCOPIC EXAM (OLG) - Abnormal    RBC Morph Abnormal (*)     Macrocytosis 1+ (*)     Target Cells 1+ (*)     Platelets Increased (*)    CBC W/ AUTO DIFFERENTIAL    Narrative:     The following orders were created for panel order CBC auto differential.  Procedure                               Abnormality         Status                     ---------                               -----------         ------                     CBC with Differential[[id number]]       Abnormal            Final result                 Please view results for these tests on the individual orders.   PREGNANCY TEST, URINE RAPID          Imaging Results              US Abdomen Limited (Final result)  Result time 03/31/25 16:57:21      Final result by Shailesh White MD (03/31/25 16:57:21)                   Impression:      Cholelithiasis with positive sonographic Pearl sign, but no significant ascites or gallbladder wall thickening.    Common bile duct not confidently visualized on this exam.    Mild hepatomegaly.      Electronically signed by: Shailesh White  Date:    03/31/2025  Time:    16:57               Narrative:    EXAMINATION:  US ABDOMEN LIMITED    CLINICAL HISTORY:  Cholelithiasis;    TECHNIQUE:  Gray-scale and color Doppler ultrasound images of the abdomen.    COMPARISON:  CT 03/31/2025    FINDINGS:  Poor visualization of the pancreas and IVC.    Liver mildly enlarged.  Main portal vein patent with normal flow direction.  Common bile duct not confidently seen.  Cholelithiasis.  No gallbladder wall thickening or significant ascites.  Positive sonographic Pearl sign.    No right-sided hydronephrosis.    Measurements:    - Liver: 18.8 cm    - Right kidney: 7.5 cm in length                                       Medications   piperacillin-tazobactam (ZOSYN) 4.5 g in D5W 100 mL IVPB (MB+) (has no administration in time range)     Medical Decision Making  Differential diagnosis includes but is not limited to  appendicitis, biliary disease, diverticulitis,  AAA, ACS, mesenteric ischemia, intraabdominal abcess, retroperitoneal abcess, gastritis, gastroenteritis, hepatitis, hernia, pancreatitis, inflammatory bowel disease, PUD, SBP, nephrolithiasis, DKA,  consitpation, GERD, IBS    Patient with concerns for choledocholithiasis given both labs and imaging.  Discussed with GI asked that we started Zosyn admit.  We will consult surgery.  Discussed with Sylvia on-call for hospitalist.  Happy to admit.  Both patient, sitter in room happy for admission.  NPO at midnight we will admit for further evaluation management by GI.    Amount and/or Complexity of Data Reviewed  Independent Historian: caregiver     Details: See HPI  External Data Reviewed: notes.     Details: See ED course  Labs: ordered. Decision-making details documented in ED Course.  Radiology: ordered and independent interpretation performed. Decision-making details documented in ED Course.    Risk  OTC drugs.  Prescription drug management.  Decision regarding hospitalization.            Scribe Attestation:   Scribe #1: I performed the above scribed service and the documentation accurately describes the services I performed. I attest to the accuracy of the note.    Attending Attestation:           Physician Attestation for Scribe:  Physician Attestation Statement for Scribe #1: I, Kumar Pereira MD, reviewed documentation, as scribed by Fabricio Aguilar in my presence, and it is both accurate and complete.             ED Course as of 03/31/25 1855   Mon Mar 31, 2025   6878 GI paged [JM]   0311 Spoke to GI doc, Dr. Lucero, advise NPO at midnight agrees with antibiotics and surgery consult admit to medicine [MM]   1851 WBC(!): 13.90 [MM]   1851 Hemoglobin(!): 11.8 [MM]   1851 Hematocrit(!): 36.5 [MM]   1851 Sodium: 136 [MM]   1851 Potassium: 3.5 [MM]   1851 Chloride: 99 [MM]   1851 CO2: 25 [MM]   1851 ALP(!): 655 [MM]   1851 ALT(!): 289 [MM]   1851 AST(!): 258 [MM]    1851 BILIRUBIN TOTAL: 1.3 [MM]   1851 Bilirubin Direct(!): 0.9 [MM]   1852 CT from earlier today with dilated intrahepatic ducts and gallstones [MM]      ED Course User Index  [JM] Papito Aguilar  [MM] Kumar Pereira MD                           Clinical Impression:  Final diagnoses:  [K80.50] Choledocholithiasis (Primary)          ED Disposition Condition    Admit Stable                    [1]   Social History  Tobacco Use    Smoking status: Never    Smokeless tobacco: Never   Substance Use Topics    Alcohol use: Never    Drug use: Never        Kumar Pereira MD  03/31/25 1855

## 2025-04-01 ENCOUNTER — ANESTHESIA EVENT (OUTPATIENT)
Dept: ENDOSCOPY | Facility: HOSPITAL | Age: 45
End: 2025-04-01
Payer: MEDICARE

## 2025-04-01 ENCOUNTER — ANESTHESIA (OUTPATIENT)
Dept: ENDOSCOPY | Facility: HOSPITAL | Age: 45
End: 2025-04-01
Payer: MEDICARE

## 2025-04-01 LAB
ALBUMIN SERPL-MCNC: 3.1 G/DL (ref 3.5–5)
ALBUMIN/GLOB SERPL: 0.8 RATIO (ref 1.1–2)
ALP SERPL-CCNC: 640 UNIT/L (ref 40–150)
ALT SERPL-CCNC: 256 UNIT/L (ref 0–55)
ANION GAP SERPL CALC-SCNC: 11 MEQ/L
AST SERPL-CCNC: 168 UNIT/L (ref 11–45)
B-HCG UR QL: NEGATIVE
BASOPHILS # BLD AUTO: 0.07 X10(3)/MCL
BASOPHILS NFR BLD AUTO: 0.5 %
BILIRUB SERPL-MCNC: 1.5 MG/DL
BUN SERPL-MCNC: 6.4 MG/DL (ref 7–18.7)
CALCIUM SERPL-MCNC: 9 MG/DL (ref 8.4–10.2)
CHLORIDE SERPL-SCNC: 103 MMOL/L (ref 98–107)
CO2 SERPL-SCNC: 25 MMOL/L (ref 22–29)
CREAT SERPL-MCNC: 0.64 MG/DL (ref 0.55–1.02)
CREAT/UREA NIT SERPL: 10
EOSINOPHIL # BLD AUTO: 0.23 X10(3)/MCL (ref 0–0.9)
EOSINOPHIL NFR BLD AUTO: 1.6 %
ERYTHROCYTE [DISTWIDTH] IN BLOOD BY AUTOMATED COUNT: 22 % (ref 11.5–17)
GFR SERPLBLD CREATININE-BSD FMLA CKD-EPI: >60 ML/MIN/1.73/M2
GLOBULIN SER-MCNC: 4 GM/DL (ref 2.4–3.5)
GLUCOSE SERPL-MCNC: 115 MG/DL (ref 74–100)
HCT VFR BLD AUTO: 35.2 % (ref 37–47)
HGB BLD-MCNC: 11.9 G/DL (ref 12–16)
IMM GRANULOCYTES # BLD AUTO: 0.06 X10(3)/MCL (ref 0–0.04)
IMM GRANULOCYTES NFR BLD AUTO: 0.4 %
INR PPP: 1
LIPASE SERPL-CCNC: 19 U/L
LYMPHOCYTES # BLD AUTO: 2.79 X10(3)/MCL (ref 0.6–4.6)
LYMPHOCYTES NFR BLD AUTO: 19.2 %
MAGNESIUM SERPL-MCNC: 1.8 MG/DL (ref 1.6–2.6)
MCH RBC QN AUTO: 26.5 PG (ref 27–31)
MCHC RBC AUTO-ENTMCNC: 33.8 G/DL (ref 33–36)
MCV RBC AUTO: 78.4 FL (ref 80–94)
MONOCYTES # BLD AUTO: 0.82 X10(3)/MCL (ref 0.1–1.3)
MONOCYTES NFR BLD AUTO: 5.6 %
NEUTROPHILS # BLD AUTO: 10.59 X10(3)/MCL (ref 2.1–9.2)
NEUTROPHILS NFR BLD AUTO: 72.7 %
NRBC BLD AUTO-RTO: 0 %
PLATELET # BLD AUTO: 444 X10(3)/MCL (ref 130–400)
PMV BLD AUTO: 10.9 FL (ref 7.4–10.4)
POTASSIUM SERPL-SCNC: 3 MMOL/L (ref 3.5–5.1)
PROT SERPL-MCNC: 7.1 GM/DL (ref 6.4–8.3)
PROTHROMBIN TIME: 12.8 SECONDS (ref 12.5–14.5)
RBC # BLD AUTO: 4.49 X10(6)/MCL (ref 4.2–5.4)
SODIUM SERPL-SCNC: 139 MMOL/L (ref 136–145)
WBC # BLD AUTO: 14.56 X10(3)/MCL (ref 4.5–11.5)

## 2025-04-01 PROCEDURE — 80053 COMPREHEN METABOLIC PANEL: CPT | Performed by: NURSE PRACTITIONER

## 2025-04-01 PROCEDURE — D9220A PRA ANESTHESIA: Mod: ANES,,, | Performed by: ANESTHESIOLOGY

## 2025-04-01 PROCEDURE — 36415 COLL VENOUS BLD VENIPUNCTURE: CPT | Performed by: NURSE PRACTITIONER

## 2025-04-01 PROCEDURE — 25000003 PHARM REV CODE 250: Performed by: STUDENT IN AN ORGANIZED HEALTH CARE EDUCATION/TRAINING PROGRAM

## 2025-04-01 PROCEDURE — 25000003 PHARM REV CODE 250: Performed by: NURSE PRACTITIONER

## 2025-04-01 PROCEDURE — 25000003 PHARM REV CODE 250: Performed by: NURSE ANESTHETIST, CERTIFIED REGISTERED

## 2025-04-01 PROCEDURE — 83735 ASSAY OF MAGNESIUM: CPT | Performed by: NURSE PRACTITIONER

## 2025-04-01 PROCEDURE — 0FC98ZZ EXTIRPATION OF MATTER FROM COMMON BILE DUCT, VIA NATURAL OR ARTIFICIAL OPENING ENDOSCOPIC: ICD-10-PCS | Performed by: INTERNAL MEDICINE

## 2025-04-01 PROCEDURE — 36415 COLL VENOUS BLD VENIPUNCTURE: CPT | Performed by: INTERNAL MEDICINE

## 2025-04-01 PROCEDURE — 85025 COMPLETE CBC W/AUTO DIFF WBC: CPT | Performed by: NURSE PRACTITIONER

## 2025-04-01 PROCEDURE — 37000009 HC ANESTHESIA EA ADD 15 MINS: Performed by: INTERNAL MEDICINE

## 2025-04-01 PROCEDURE — 27201423 OPTIME MED/SURG SUP & DEVICES STERILE SUPPLY: Performed by: INTERNAL MEDICINE

## 2025-04-01 PROCEDURE — 25500020 PHARM REV CODE 255: Performed by: INTERNAL MEDICINE

## 2025-04-01 PROCEDURE — D9220A PRA ANESTHESIA: Mod: CRNA,,, | Performed by: NURSE ANESTHETIST, CERTIFIED REGISTERED

## 2025-04-01 PROCEDURE — 85610 PROTHROMBIN TIME: CPT | Performed by: INTERNAL MEDICINE

## 2025-04-01 PROCEDURE — 43262 ENDO CHOLANGIOPANCREATOGRAPH: CPT | Performed by: INTERNAL MEDICINE

## 2025-04-01 PROCEDURE — C1769 GUIDE WIRE: HCPCS | Performed by: INTERNAL MEDICINE

## 2025-04-01 PROCEDURE — 63600175 PHARM REV CODE 636 W HCPCS: Performed by: NURSE ANESTHETIST, CERTIFIED REGISTERED

## 2025-04-01 PROCEDURE — 83690 ASSAY OF LIPASE: CPT | Performed by: STUDENT IN AN ORGANIZED HEALTH CARE EDUCATION/TRAINING PROGRAM

## 2025-04-01 PROCEDURE — 43264 ERCP REMOVE DUCT CALCULI: CPT | Performed by: INTERNAL MEDICINE

## 2025-04-01 PROCEDURE — 99223 1ST HOSP IP/OBS HIGH 75: CPT | Mod: GC,,, | Performed by: STUDENT IN AN ORGANIZED HEALTH CARE EDUCATION/TRAINING PROGRAM

## 2025-04-01 PROCEDURE — 25000003 PHARM REV CODE 250

## 2025-04-01 PROCEDURE — 11000001 HC ACUTE MED/SURG PRIVATE ROOM

## 2025-04-01 PROCEDURE — 81025 URINE PREGNANCY TEST: CPT

## 2025-04-01 PROCEDURE — 37000008 HC ANESTHESIA 1ST 15 MINUTES: Performed by: INTERNAL MEDICINE

## 2025-04-01 RX ORDER — ONDANSETRON HYDROCHLORIDE 2 MG/ML
INJECTION, SOLUTION INTRAMUSCULAR; INTRAVENOUS
Status: DISCONTINUED | OUTPATIENT
Start: 2025-04-01 | End: 2025-04-01

## 2025-04-01 RX ORDER — MIDAZOLAM HYDROCHLORIDE 1 MG/ML
INJECTION INTRAMUSCULAR; INTRAVENOUS
Status: DISCONTINUED | OUTPATIENT
Start: 2025-04-01 | End: 2025-04-01

## 2025-04-01 RX ORDER — DEXAMETHASONE SODIUM PHOSPHATE 4 MG/ML
INJECTION, SOLUTION INTRA-ARTICULAR; INTRALESIONAL; INTRAMUSCULAR; INTRAVENOUS; SOFT TISSUE
Status: DISCONTINUED | OUTPATIENT
Start: 2025-04-01 | End: 2025-04-01

## 2025-04-01 RX ORDER — SUCCINYLCHOLINE CHLORIDE 20 MG/ML
INJECTION INTRAMUSCULAR; INTRAVENOUS
Status: DISCONTINUED | OUTPATIENT
Start: 2025-04-01 | End: 2025-04-01

## 2025-04-01 RX ORDER — PROPOFOL 10 MG/ML
VIAL (ML) INTRAVENOUS
Status: DISCONTINUED | OUTPATIENT
Start: 2025-04-01 | End: 2025-04-01

## 2025-04-01 RX ORDER — LIDOCAINE HYDROCHLORIDE 10 MG/ML
1 INJECTION, SOLUTION EPIDURAL; INFILTRATION; INTRACAUDAL; PERINEURAL ONCE
Status: DISCONTINUED | OUTPATIENT
Start: 2025-04-01 | End: 2025-04-02 | Stop reason: SDUPTHER

## 2025-04-01 RX ORDER — CEFTRIAXONE 1 G/1
INJECTION, POWDER, FOR SOLUTION INTRAMUSCULAR; INTRAVENOUS
Status: COMPLETED
Start: 2025-04-01 | End: 2025-04-01

## 2025-04-01 RX ORDER — FENTANYL CITRATE 50 UG/ML
INJECTION, SOLUTION INTRAMUSCULAR; INTRAVENOUS
Status: DISCONTINUED | OUTPATIENT
Start: 2025-04-01 | End: 2025-04-01

## 2025-04-01 RX ORDER — SODIUM CITRATE AND CITRIC ACID MONOHYDRATE 334; 500 MG/5ML; MG/5ML
30 SOLUTION ORAL
Status: DISCONTINUED | OUTPATIENT
Start: 2025-04-01 | End: 2025-04-02 | Stop reason: HOSPADM

## 2025-04-01 RX ORDER — ROCURONIUM BROMIDE 10 MG/ML
INJECTION, SOLUTION INTRAVENOUS
Status: DISCONTINUED | OUTPATIENT
Start: 2025-04-01 | End: 2025-04-01

## 2025-04-01 RX ORDER — SODIUM CHLORIDE, SODIUM GLUCONATE, SODIUM ACETATE, POTASSIUM CHLORIDE AND MAGNESIUM CHLORIDE 30; 37; 368; 526; 502 MG/100ML; MG/100ML; MG/100ML; MG/100ML; MG/100ML
INJECTION, SOLUTION INTRAVENOUS CONTINUOUS
Status: DISCONTINUED | OUTPATIENT
Start: 2025-04-01 | End: 2025-04-04 | Stop reason: HOSPADM

## 2025-04-01 RX ORDER — LIDOCAINE HYDROCHLORIDE 20 MG/ML
INJECTION, SOLUTION EPIDURAL; INFILTRATION; INTRACAUDAL; PERINEURAL
Status: DISCONTINUED | OUTPATIENT
Start: 2025-04-01 | End: 2025-04-01

## 2025-04-01 RX ORDER — INDOMETHACIN 50 MG/1
100 SUPPOSITORY RECTAL ONCE
Status: DISCONTINUED | OUTPATIENT
Start: 2025-04-01 | End: 2025-04-02 | Stop reason: HOSPADM

## 2025-04-01 RX ORDER — INSULIN ASPART 100 [IU]/ML
0-5 INJECTION, SOLUTION INTRAVENOUS; SUBCUTANEOUS EVERY 4 HOURS PRN
Status: DISCONTINUED | OUTPATIENT
Start: 2025-04-01 | End: 2025-04-02 | Stop reason: HOSPADM

## 2025-04-01 RX ORDER — CEFTRIAXONE 250 MG/1
INJECTION, POWDER, FOR SOLUTION INTRAMUSCULAR; INTRAVENOUS
Status: DISCONTINUED | OUTPATIENT
Start: 2025-04-01 | End: 2025-04-01

## 2025-04-01 RX ORDER — POTASSIUM CHLORIDE 20 MEQ/1
40 TABLET, EXTENDED RELEASE ORAL 2 TIMES DAILY
Status: COMPLETED | OUTPATIENT
Start: 2025-04-01 | End: 2025-04-01

## 2025-04-01 RX ORDER — INDOMETHACIN 50 MG/1
SUPPOSITORY RECTAL
Status: COMPLETED
Start: 2025-04-01 | End: 2025-04-01

## 2025-04-01 RX ADMIN — POTASSIUM CHLORIDE 40 MEQ: 1500 TABLET, EXTENDED RELEASE ORAL at 08:04

## 2025-04-01 RX ADMIN — ONDANSETRON 4 MG: 2 INJECTION INTRAMUSCULAR; INTRAVENOUS at 12:04

## 2025-04-01 RX ADMIN — FLUOXETINE HYDROCHLORIDE 20 MG: 20 CAPSULE ORAL at 08:04

## 2025-04-01 RX ADMIN — FENTANYL CITRATE 50 MCG: 50 INJECTION, SOLUTION INTRAMUSCULAR; INTRAVENOUS at 12:04

## 2025-04-01 RX ADMIN — LIDOCAINE HYDROCHLORIDE 80 MG: 20 INJECTION, SOLUTION INTRAVENOUS at 12:04

## 2025-04-01 RX ADMIN — SUGAMMADEX 200 MG: 100 INJECTION, SOLUTION INTRAVENOUS at 12:04

## 2025-04-01 RX ADMIN — DEXAMETHASONE SODIUM PHOSPHATE 4 MG: 4 INJECTION, SOLUTION INTRA-ARTICULAR; INTRALESIONAL; INTRAMUSCULAR; INTRAVENOUS; SOFT TISSUE at 12:04

## 2025-04-01 RX ADMIN — INDOMETHACIN 100 MG: 50 SUPPOSITORY RECTAL at 12:04

## 2025-04-01 RX ADMIN — OLANZAPINE 15 MG: 5 TABLET, FILM COATED ORAL at 08:04

## 2025-04-01 RX ADMIN — SODIUM CHLORIDE, SODIUM GLUCONATE, SODIUM ACETATE, POTASSIUM CHLORIDE AND MAGNESIUM CHLORIDE: 526; 502; 368; 37; 30 INJECTION, SOLUTION INTRAVENOUS at 12:04

## 2025-04-01 RX ADMIN — MIDAZOLAM HYDROCHLORIDE 2 MG: 1 INJECTION, SOLUTION INTRAMUSCULAR; INTRAVENOUS at 12:04

## 2025-04-01 RX ADMIN — ROCURONIUM BROMIDE 50 MG: 10 SOLUTION INTRAVENOUS at 12:04

## 2025-04-01 RX ADMIN — SUCCINYLCHOLINE CHLORIDE 140 MG: 20 INJECTION, SOLUTION INTRAMUSCULAR; INTRAVENOUS at 12:04

## 2025-04-01 RX ADMIN — CEFTRIAXONE SODIUM 2 G: 250 INJECTION, POWDER, FOR SOLUTION INTRAMUSCULAR; INTRAVENOUS at 12:04

## 2025-04-01 RX ADMIN — PROPOFOL 200 MG: 10 INJECTION, EMULSION INTRAVENOUS at 12:04

## 2025-04-01 NOTE — ANESTHESIA PREPROCEDURE EVALUATION
"04/01/2025    Martine Nina is a 44 y.o., female PMH of bipolar, schizophrenia, dyslipidemia, obesity. Here for ERCP.    Pre-operative evaluation for Procedure(s) (LRB):  ERCP (N/A)    Pre-op Assessment    I have reviewed the Patient Summary Reports.     I have reviewed the Nursing Notes. I have reviewed the NPO Status.   I have reviewed the Medications.     Review of Systems  Anesthesia Hx:  No problems with previous Anesthesia                Cardiovascular:  Exercise tolerance: good                                                 Past Medical History:   Diagnosis Date    Bipolar disorder, unspecified     Depression     Hypercholesteremia     Schizophrenia, unspecified        Problem List[1]    Review of patient's allergies indicates:   Allergen Reactions    Hydrocodone-acetaminophen        Current Outpatient Medications   Medication Instructions    FLUoxetine 20 mg, Daily    hydroCHLOROthiazide (HYDRODIURIL) 25 mg    OLANZapine (ZYPREXA) 20 mg, Oral, Nightly    pravastatin (PRAVACHOL) 20 mg, Nightly       History reviewed. No pertinent surgical history.    Social History[2]    BP (!) 156/98 (BP Location: Left arm, Patient Position: Lying)   Pulse 78   Temp (!) 35.3 °C (95.5 °F) (Temporal)   Resp 18   Ht 5' 4" (1.626 m)   Wt 109.1 kg (240 lb 8 oz)   SpO2 100%   Breastfeeding No   BMI 41.28 kg/m²       Physical Exam  General: Well nourished and Cooperative    Airway:  Mallampati: II   Mouth Opening: Normal  TM Distance: Normal  Tongue: Normal  Neck ROM: Normal ROM    Dental:  Edentulous    Chest/Lungs:  Clear to auscultation    Heart:  Rhythm: Regular Rhythm        Lab Results   Component Value Date    WBC 14.56 (H) 04/01/2025    HGB 11.9 (L) 04/01/2025    HCT 35.2 (L) 04/01/2025    MCV 78.4 (L) 04/01/2025     (H) 04/01/2025          BMP  Lab Results   Component Value Date    HCT 35.2 (L) 04/01/2025     04/01/2025    K 3.0 (L) 04/01/2025    BUN 6.4 (L) 04/01/2025    CREATININE 0.64 04/01/2025 "    CALCIUM 9.0 04/01/2025          Diagnostic Studies:  .  EKG  No results found for this or any previous visit.      Anesthesia Plan  Type of Anesthesia, risks & benefits discussed:    Anesthesia Type: Gen ETT  Intra-op Monitoring Plan: Standard ASA Monitors  Post Op Pain Control Plan: multimodal analgesia  Induction:  IV  Informed Consent: Informed consent signed with the Patient and all parties understand the risks and agree with anesthesia plan.  All questions answered.   ASA Score: 2  Day of Surgery Review of History & Physical: H&P Update referred to the surgeon/provider.    Ready For Surgery From Anesthesia Perspective.     .  Anesthesia consent includes material facts, risks, complications & alternatives, and possibility of altering the anesthesia plan due to intraoperative conditions.    I reviewed problem list, prior to admission medication list, appropriate labs, any workup, Xray, EKG etc   See anesthesia chart for details of the anesthesia plan carried out.       Milton Hawley MD    ¤                [1] There is no problem list on file for this patient.  [2]   Social History  Socioeconomic History    Marital status: Single   Tobacco Use    Smoking status: Never    Smokeless tobacco: Never   Substance and Sexual Activity    Alcohol use: Never    Drug use: Never     Social Drivers of Health     Financial Resource Strain: Low Risk  (4/1/2025)    Overall Financial Resource Strain (CARDIA)     Difficulty of Paying Living Expenses: Not very hard   Food Insecurity: No Food Insecurity (4/1/2025)    Hunger Vital Sign     Worried About Running Out of Food in the Last Year: Never true     Ran Out of Food in the Last Year: Never true   Transportation Needs: No Transportation Needs (3/31/2025)    PRAPARE - Transportation     Lack of Transportation (Medical): No     Lack of Transportation (Non-Medical): No   Physical Activity: Inactive (4/1/2025)    Exercise Vital Sign     Days of Exercise per Week: 0 days      Minutes of Exercise per Session: 0 min   Stress: No Stress Concern Present (4/1/2025)    [place] [employer] - Occupational Stress Questionnaire     Feeling of Stress : Not at all   Housing Stability: Low Risk  (4/1/2025)    Housing Stability Vital Sign     Unable to Pay for Housing in the Last Year: No     Homeless in the Last Year: No

## 2025-04-01 NOTE — PLAN OF CARE
04/01/25 0846   Discharge Assessment   Assessment Type Discharge Planning Assessment   Confirmed/corrected address, phone number and insurance Yes   Confirmed Demographics Correct on Facesheet   Source of Information patient   Communicated SHAD with patient/caregiver Date not available/Unable to determine   Reason For Admission Choledochlithiasis   People in Home alone   Do you expect to return to your current living situation? Yes   Do you have help at home or someone to help you manage your care at home? Yes   Who are your caregiver(s) and their phone number(s)? caretaker   Prior to hospitilization cognitive status: Alert/Oriented   Current cognitive status: Alert/Oriented   Walking or Climbing Stairs Difficulty no   Dressing/Bathing Difficulty no   Home Accessibility wheelchair accessible   Equipment Currently Used at Home none   Readmission within 30 days? No   Patient currently being followed by outpatient case management? No   Do you currently have service(s) that help you manage your care at home? Yes   How Many hours does patient receive services 15   Name and Contact number of agency ADL-caretaker   Is the pt/caregiver preference to resume services with current agency Yes   Do you have prescription coverage? Yes   Coverage mcr   Who is going to help you get home at discharge? tbd   How do you get to doctors appointments? family or friend will provide   Are you on dialysis? No   Do you take coumadin? No   Discharge Plan A Home  (caretaker)   Discharge Plan B Home   DME Needed Upon Discharge  none   Discharge Plan discussed with: Patient   Transition of Care Barriers Mental illness   Physical Activity   On average, how many days per week do you engage in moderate to strenuous exercise (like a brisk walk)? 0 days   On average, how many minutes do you engage in exercise at this level? 0 min   Financial Resource Strain   How hard is it for you to pay for the very basics like food, housing, medical care, and  heating? Not very   Housing Stability   In the last 12 months, was there a time when you were not able to pay the mortgage or rent on time? N   At any time in the past 12 months, were you homeless or living in a shelter (including now)? N   Food Insecurity   Within the past 12 months, you worried that your food would run out before you got the money to buy more. Never true   Within the past 12 months, the food you bought just didn't last and you didn't have money to get more. Never true   Stress   Do you feel stress - tense, restless, nervous, or anxious, or unable to sleep at night because your mind is troubled all the time - these days? Not at all   Social Isolation   How often do you feel lonely or isolated from those around you?  Never   Alcohol Use   Q1: How often do you have a drink containing alcohol? Never   Utilities   In the past 12 months has the electric, gas, oil, or water company threatened to shut off services in your home? No   Health Literacy   How often do you need to have someone help you when you read instructions, pamphlets, or other written material from your doctor or pharmacy? Sometimes

## 2025-04-01 NOTE — PROGRESS NOTES
"[facility] Hospital Medicine Progress Note        Chief Complaint: Inpatient Follow-up for     HPI:   44 yr old female whose history includes Bipolar disorder, schizophrenia and dyslipidemia. At the time of my exam patient is AAO x 3. Appears comfortable. Her personal home sitter at bedside. Reported having abdominal pain 2 weeks ago which lasted about a week prior to resolving but returned again yesterday. Outpatient labs and CT abdomen/pelvis ordered by PCP which revealed a significant transaminitis and stones in CBD. She was instructed to present to ED for further evaluation. Endorses vomiting after eating a "heavy" meal and a 17 pound weight loss since 3/6/25.     CT abdomen and pelvis with and without contrast showed mild hepatomegaly with dilated intrahepatic and extrahepatic biliary ducts with suspected 2-3 mm stones of the distal common bile duct/pancreatic head, cholelithiasis, moderate-size hiatal hernia with mucosal thickening and findings of constipation with dense feces at the left-sided the colon including a dense fecal bolus at the rectum measuring up to 7 cm in diameter.     VS on arrival: T 98.2, P 74, R 20, B/P 140/93, sats 99% on room air. Initial labs include WBC 13.9 (appears chronic), , , .  Abdominal ultrasound shows cholelithiasis with a positive sonographic Pearl's sign but no significant ascites or gallbladder wall thickening.  Common bile duct not confidently visualized on this exam.  Mild hepatomegaly.    Interval Hx:   Patient was seen and evaluated at bedside, oxygenating well on room air.  No current complaints.  Denies any right upper quadrant tenderness or pain to palpation.  Awaiting on ERCP results.  Case was discussed with patient's nurse and  on the floor.    Objective/physical exam:  General: In no acute distress, afebrile  Chest: Clear to auscultation bilaterally  Heart: RRR, +S1, S2, no appreciable " murmur  Abdomen: Soft, nontender, BS +  MSK: Warm, no lower extremity edema, no clubbing or cyanosis  Neurologic: Alert and oriented x4, Cranial nerve II-XII intact, Strength 5/5 in all 4 extremities    VITAL SIGNS: 24 HRS MIN & MAX LAST   Temp  Min: 95.5 °F (35.3 °C)  Max: 98.7 °F (37.1 °C) 98.1 °F (36.7 °C)   BP  Min: 114/76  Max: 156/98 (!) 151/83   Pulse  Min: 62  Max: 86  72   Resp  Min: 14  Max: 18 14   SpO2  Min: 95 %  Max: 100 % 96 %     I have reviewed the following labs:  Recent Labs   Lab 03/31/25  1619 04/01/25  0459   WBC 13.90* 14.56*   RBC 4.54 4.49   HGB 11.8* 11.9*   HCT 36.5* 35.2*   MCV 80.4 78.4*   MCH 26.0* 26.5*   MCHC 32.3* 33.8   RDW 22.3* 22.0*   * 444*   MPV 10.7* 10.9*     Recent Labs   Lab 03/31/25  1619 04/01/25  0459    139   K 3.5 3.0*   CL 99 103   CO2 25 25   BUN 9.0 6.4*   CREATININE 0.72 0.64   CALCIUM 9.2 9.0   MG  --  1.80   ALBUMIN 3.0* 3.1*   ALKPHOS 655* 640*   * 256*   * 168*   BILITOT 1.3 1.5     Microbiology Results (last 7 days)       ** No results found for the last 168 hours. **             See below for Radiology    Assessment/Plan:  Choledocholithiasis vs biliary sludging  Abdominal pain   Schizophrenia/Bipolar disorder     -CT abdomen WWO contrast showed cholelithiasis with 2-3 mm stones  -U/S showed a enlarged liver with cholelithiasis without gallbladder thickening  -MRCP shows cholelithiasis without cholecystitis.  Also noted intra and extrahepatic biliary duct dilation with CBD filling defect  -ERCP performed today  - GI and surgery following  -surgery plans for cholecystectomy tomorrow  - repeat labs in AM   - home psych resumed    VTE prophylaxis:  SCDs    Patient condition:  Stable    Anticipated discharge and Disposition:   Home at discharge      All diagnosis and differential diagnosis have been reviewed; assessment and plan has been documented; I have personally reviewed the labs and test results that are presently available; I  have reviewed the patients medication list; I have reviewed the consulting providers response and recommendations. I have reviewed or attempted to review medical records based upon their availability    All of the patient's questions have been  addressed and answered. Patient's is agreeable to the above stated plan. I will continue to monitor closely and make adjustments to medical management as needed.    Portions of this note dictated using EMR integrated voice recognition software, and may be subject to voice recognition errors not corrected at proofreading. Please contact writer for clarification if needed.   _____________________________________________________________________    Malnutrition Status:  Nutrition consulted. Most recent weight and BMI monitored-     Measurements:  Wt Readings from Last 1 Encounters:   04/01/25 109.1 kg (240 lb 8 oz)   Body mass index is 41.28 kg/m².    Patient has been screened and assessed by RD.    Malnutrition Type:  Context:    Level:      Malnutrition Characteristic Summary:       Interventions/Recommendations (treatment strategy):        Scheduled Med:   FLUoxetine  20 mg Oral Daily    indomethacin  100 mg Rectal Once    LIDOcaine (PF) 10 mg/ml (1%)  1 mL Intradermal Once    [START ON 4/2/2025] linaCLOtide  290 mcg Oral Before breakfast    OLANZapine  15 mg Oral QHS    potassium chloride  40 mEq Oral BID      Continuous Infusions:   electrolyte-A   Intravenous Continuous          PRN Meds:    Current Facility-Administered Medications:     acetaminophen, 1,000 mg, Oral, Q6H PRN    aluminum-magnesium hydroxide-simethicone, 30 mL, Oral, QID PRN    bisacodyL, 10 mg, Rectal, Daily PRN    dextrose 50%, 12.5 g, Intravenous, PRN    dextrose 50%, 12.5 g, Intravenous, PRN    dextrose 50%, 25 g, Intravenous, PRN    glucagon (human recombinant), 1 mg, Intramuscular, PRN    glucose, 16 g, Oral, PRN    glucose, 24 g, Oral, PRN    insulin aspart U-100, 0-5 Units, Subcutaneous, Q4H PRN     melatonin, 6 mg, Oral, Nightly PRN    naloxone, 0.02 mg, Intravenous, PRN    ondansetron, 4 mg, Intravenous, Q4H PRN    prochlorperazine, 5 mg, Intravenous, Q6H PRN    senna-docusate, 1 tablet, Oral, BID PRN    sodium chloride 0.9%, 10 mL, Intravenous, PRN    sodium citrate-citric acid 500-334 mg/5 ml, 30 mL, Oral, PRN     Radiology:  I have personally reviewed the following imaging and agree with the radiologist.     FL ERCP Biliary And Pancreatic By Rad Tech  Narrative: EXAMINATION:  FL ERCP BILIARY AND PANCREATIC    CLINICAL HISTORY:  Evaluate Billary Patency;    FINDINGS:  Fluoroscopic assistance provided during ERCP.  Images were independently interpreted by the attending physician performing the procedure.    Fluoro time: 4 minutes 47 seconds.    Reference Air Kerma: 101 mGy.  Impression: Fluoroscopic assistance provided as above.    Electronically signed by: Joon Bone  Date:    04/01/2025  Time:    13:34  MRI MRCP Abdomen WO Contrast 3D WO Independent WS XPD  Narrative: EXAMINATION:  MRI MRCP ABDOMEN WO CONTRAST 3D WO INDEPENDENT WS XPD    CLINICAL HISTORY:  Transaminitis.Biliary obstruction suspected;    TECHNIQUE:  Multiplanar multisequence MRI of the abdomen performed without gadolinium based IV contrast according to MRCP protocol.    COMPARISON:  CT yesterday    FINDINGS:  Liver is not significantly enlarged.  No focal suspicious liver lesion on this noncontrast exam.    There are gallstones.  No significant pericholecystic inflammation.  There is intra and extrahepatic biliary ductal dilatation with the common bile duct measuring up to about 13 mm.  There is a 7 mm filling defect in the distal common bile duct best visualized on image 51 series 9.  Question another filling defect at the ampulla.    No significant abnormality of the spleen.  The main pancreatic duct is not dilated.  Normal adrenals.  No hydronephrosis.    There is a moderate hiatal hernia.  No dilated bowel loops.  No ascites.   Abdominal aorta normal in caliber.  Impression: 1. Cholelithiasis without convincing MRI evidence of acute cholecystitis.  2. Intra and extrahepatic biliary ductal dilatation with a distal common bile duct filling defect compatible with choledocholithiasis.    Electronically signed by: Joon Bone  Date:    04/01/2025  Time:    09:19      Christophe Goldsmith MD  Department of Hospital Medicine   [facility]   04/01/2025

## 2025-04-01 NOTE — ANESTHESIA PROCEDURE NOTES
Intubation    Date/Time: 4/1/2025 12:21 PM    Performed by: Tom Rodriguez CRNA  Authorized by: Milton Hawley MD    Intubation:     Induction:  Intravenous    Intubated:  Postinduction    Mask Ventilation:  Easy mask    Attempts:  1    Attempted By:  CRNA    Method of Intubation:  Direct    Blade:  Aguilar 2    Laryngeal View Grade: Grade I - full view of cords      Difficult Airway Encountered?: No      Complications:  None    Airway Device:  Oral endotracheal tube    Airway Device Size:  7.0    Style/Cuff Inflation:  Cuffed (inflated to minimal occlusive pressure)    Tube secured:  22    Secured at:  The lips    Placement Verified By:  Capnometry    Complicating Factors:  None    Findings Post-Intubation:  BS equal bilateral

## 2025-04-01 NOTE — CONSULTS
Consult Note    Reason for Consult:      We were consulted to evaluate this patient for choledocholithiasis.     HPI:     44-year-old female unknown to our group with a PMH of bipolar, schizophrenia, dyslipidemia, obesity.    Patient was referred to the ED by her PCP yesterday, 03/31/2025, due to elevated LFTs and abnormal CT concerning for choledocholithiasis same day.    CT abdomen/pelvis with/without contrast was performed which noted mild hepatomegaly with dilated intra and extrahepatic biliary ducts with suspect 2-3 mm stones in the distal common bile duct/pancreatic head; cholelithiasis within the gallbladder; moderate-sized hiatal hernia with mucosal thickening; constipation with dense feces in the left side of the colon and a fecal impaction measuring up to 7 mm in diameter.    On presentation, patient afebrile and VSS.  Labs notable for leukocytosis 13,900, T bili 1.7, direct 0.9, , , alk-phos 655.  RUQ US noted cholelithiasis, CBD not confidently visualized, mild hepatomegaly.  Patient was admitted.  GI and General surgery consulted.  MRCP was obtained and is pending.  Today, LFTs slightly improved.    Patient states that she began having abdominal pain and n/v about 2 weeks ago.  Pain located in the upper abdomen.  She thought she had a virus as it had went away but then returned yesterday.  It was worse after eating heavy meals.  Reports weight loss of 17 lbs since symptom onset. Sitter is not present at this time but she is a decent historian.  She reports BM about 1x/week at baseline and does not use laxatives. At present, she has no pain or nausea.  She is NPO.     PCP:  Mateo Brannon MD    Review of patient's allergies indicates:   Allergen Reactions    Hydrocodone-acetaminophen         Current Medications[1]  Prescriptions Prior to Admission[2]    Past Medical History:  Past Medical History:   Diagnosis Date    Bipolar disorder, unspecified     Depression     Hypercholesteremia      Schizophrenia, unspecified       Past Surgical History:  No past surgical history on file.   Family History:  No family history on file.  Social History:  Social History     Tobacco Use    Smoking status: Never    Smokeless tobacco: Never   Substance Use Topics    Alcohol use: Never       Review of Systems:     Review of Systems   Constitutional:  Negative for appetite change, chills, diaphoresis, fatigue, fever and unexpected weight change.   HENT:  Negative for trouble swallowing.    Respiratory:  Negative for cough, chest tightness and shortness of breath.    Cardiovascular:  Negative for chest pain, palpitations and leg swelling.   Gastrointestinal:  Positive for abdominal pain, constipation, nausea and vomiting. Negative for abdominal distention, blood in stool, diarrhea and rectal pain.   Skin:  Negative for color change and pallor.   Neurological:  Negative for dizziness, weakness and light-headedness.   Psychiatric/Behavioral:  Negative for confusion.        Objective:     VITAL SIGNS: 24 HR MIN & MAX LAST    Temp  Min: 97.4 °F (36.3 °C)  Max: 98.7 °F (37.1 °C)  97.4 °F (36.3 °C)        BP  Min: 117/70  Max: 149/92  117/70     Pulse  Min: 72  Max: 94  86     Resp  Min: 14  Max: 20  18    SpO2  Min: 96 %  Max: 99 %  96 %      No intake or output data in the 24 hours ending 04/01/25 0848    Physical Exam  Constitutional:       General: She is not in acute distress.     Appearance: She is obese. She is not ill-appearing.   HENT:      Head: Normocephalic and atraumatic.   Eyes:      General: No scleral icterus.     Extraocular Movements: Extraocular movements intact.   Cardiovascular:      Rate and Rhythm: Normal rate and regular rhythm.   Pulmonary:      Effort: Pulmonary effort is normal. No respiratory distress.   Abdominal:      General: Bowel sounds are normal. There is no distension.      Palpations: Abdomen is soft. There is no mass.      Tenderness: There is no abdominal tenderness. There is no  guarding or rebound.   Musculoskeletal:         General: Normal range of motion.      Right lower leg: No edema.      Left lower leg: No edema.   Skin:     General: Skin is warm and dry.      Coloration: Skin is not jaundiced or pale.   Neurological:      Mental Status: She is alert and oriented to person, place, and time.   Psychiatric:         Mood and Affect: Mood and affect normal.           Recent Results (from the past 48 hours)   ISTAT CREATININE    Collection Time: 03/31/25 11:56 AM   Result Value Ref Range    POC Creatinine 0.8 0.5 - 1.4 mg/dL    Sample unknown    Comprehensive metabolic panel    Collection Time: 03/31/25  4:19 PM   Result Value Ref Range    Sodium 136 136 - 145 mmol/L    Potassium 3.5 3.5 - 5.1 mmol/L    Chloride 99 98 - 107 mmol/L    CO2 25 22 - 29 mmol/L    Glucose 102 (H) 74 - 100 mg/dL    Blood Urea Nitrogen 9.0 7.0 - 18.7 mg/dL    Creatinine 0.72 0.55 - 1.02 mg/dL    Calcium 9.2 8.4 - 10.2 mg/dL    Protein Total 7.2 6.4 - 8.3 gm/dL    Albumin 3.0 (L) 3.5 - 5.0 g/dL    Globulin 4.2 (H) 2.4 - 3.5 gm/dL    Albumin/Globulin Ratio 0.7 (L) 1.1 - 2.0 ratio    Bilirubin Total 1.3 <=1.5 mg/dL     (H) 40 - 150 unit/L     (H) 0 - 55 unit/L     (H) 11 - 45 unit/L    eGFR >60 mL/min/1.73/m2    Anion Gap 12.0 mEq/L    BUN/Creatinine Ratio 13    CBC with Differential    Collection Time: 03/31/25  4:19 PM   Result Value Ref Range    WBC 13.90 (H) 4.50 - 11.50 x10(3)/mcL    RBC 4.54 4.20 - 5.40 x10(6)/mcL    Hgb 11.8 (L) 12.0 - 16.0 g/dL    Hct 36.5 (L) 37.0 - 47.0 %    MCV 80.4 80.0 - 94.0 fL    MCH 26.0 (L) 27.0 - 31.0 pg    MCHC 32.3 (L) 33.0 - 36.0 g/dL    RDW 22.3 (H) 11.5 - 17.0 %    Platelet 434 (H) 130 - 400 x10(3)/mcL    MPV 10.7 (H) 7.4 - 10.4 fL    Neut % 54.4 %    Lymph % 34.7 %    Mono % 5.3 %    Eos % 4.5 %    Basophil % 0.7 %    Imm Grans % 0.4 %    Neut # 7.57 2.1 - 9.2 x10(3)/mcL    Lymph # 4.82 (H) 0.6 - 4.6 x10(3)/mcL    Mono # 0.74 0.1 - 1.3 x10(3)/mcL    Eos #  0.62 0 - 0.9 x10(3)/mcL    Baso # 0.10 <=0.2 x10(3)/mcL    Imm Gran # 0.05 (H) 0.00 - 0.04 x10(3)/mcL    NRBC% 0.0 %   Bilirubin, Direct    Collection Time: 03/31/25  4:19 PM   Result Value Ref Range    Bilirubin Direct 0.9 (H) 0.0 - <0.5 mg/dL   Blood Smear Microscopic Exam    Collection Time: 03/31/25  4:19 PM   Result Value Ref Range    RBC Morph Abnormal (A) Normal    Macrocytosis 1+ (A) (none)    Target Cells 1+ (A) (none)    Platelets Increased (A) Normal, Adequate   Comprehensive Metabolic Panel    Collection Time: 04/01/25  4:59 AM   Result Value Ref Range    Sodium 139 136 - 145 mmol/L    Potassium 3.0 (L) 3.5 - 5.1 mmol/L    Chloride 103 98 - 107 mmol/L    CO2 25 22 - 29 mmol/L    Glucose 115 (H) 74 - 100 mg/dL    Blood Urea Nitrogen 6.4 (L) 7.0 - 18.7 mg/dL    Creatinine 0.64 0.55 - 1.02 mg/dL    Calcium 9.0 8.4 - 10.2 mg/dL    Protein Total 7.1 6.4 - 8.3 gm/dL    Albumin 3.1 (L) 3.5 - 5.0 g/dL    Globulin 4.0 (H) 2.4 - 3.5 gm/dL    Albumin/Globulin Ratio 0.8 (L) 1.1 - 2.0 ratio    Bilirubin Total 1.5 <=1.5 mg/dL     (H) 40 - 150 unit/L     (H) 0 - 55 unit/L     (H) 11 - 45 unit/L    eGFR >60 mL/min/1.73/m2    Anion Gap 11.0 mEq/L    BUN/Creatinine Ratio 10    Magnesium    Collection Time: 04/01/25  4:59 AM   Result Value Ref Range    Magnesium Level 1.80 1.60 - 2.60 mg/dL   CBC with Differential    Collection Time: 04/01/25  4:59 AM   Result Value Ref Range    WBC 14.56 (H) 4.50 - 11.50 x10(3)/mcL    RBC 4.49 4.20 - 5.40 x10(6)/mcL    Hgb 11.9 (L) 12.0 - 16.0 g/dL    Hct 35.2 (L) 37.0 - 47.0 %    MCV 78.4 (L) 80.0 - 94.0 fL    MCH 26.5 (L) 27.0 - 31.0 pg    MCHC 33.8 33.0 - 36.0 g/dL    RDW 22.0 (H) 11.5 - 17.0 %    Platelet 444 (H) 130 - 400 x10(3)/mcL    MPV 10.9 (H) 7.4 - 10.4 fL    Neut % 72.7 %    Lymph % 19.2 %    Mono % 5.6 %    Eos % 1.6 %    Basophil % 0.5 %    Imm Grans % 0.4 %    Neut # 10.59 (H) 2.1 - 9.2 x10(3)/mcL    Lymph # 2.79 0.6 - 4.6 x10(3)/mcL    Mono # 0.82 0.1  - 1.3 x10(3)/mcL    Eos # 0.23 0 - 0.9 x10(3)/mcL    Baso # 0.07 <=0.2 x10(3)/mcL    Imm Gran # 0.06 (H) 0.00 - 0.04 x10(3)/mcL    NRBC% 0.0 %       US Abdomen Limited  Result Date: 3/31/2025  EXAMINATION: US ABDOMEN LIMITED CLINICAL HISTORY: Cholelithiasis; TECHNIQUE: Gray-scale and color Doppler ultrasound images of the abdomen. COMPARISON: CT 03/31/2025 FINDINGS: Poor visualization of the pancreas and IVC. Liver mildly enlarged.  Main portal vein patent with normal flow direction.  Common bile duct not confidently seen.  Cholelithiasis.  No gallbladder wall thickening or significant ascites.  Positive sonographic Pearl sign. No right-sided hydronephrosis. Measurements: - Liver: 18.8 cm - Right kidney: 7.5 cm in length     Cholelithiasis with positive sonographic Pearl sign, but no significant ascites or gallbladder wall thickening. Common bile duct not confidently visualized on this exam. Mild hepatomegaly. Electronically signed by: Shailesh White Date:    03/31/2025 Time:    16:57    CT Abdomen Pelvis W Wo Contrast  Result Date: 3/31/2025  EXAMINATION: CT ABDOMEN PELVIS W WO CONTRAST CLINICAL HISTORY: R10.84;, Generalized abdominal pain. TECHNIQUE: PATIENT RADIATION DOSE: DLP(mGycm) 2250 As per PQRS measures, all CT scans at this facility used dose modulation, iterative reconstruction, and/or weight based dose adjustment when appropriate to reduce radiation dose to as low as reasonably achievable. COMPARISON: None available FINDINGS: Serial axial images were obtained through the abdomen and pelvis with and without the administration of  IV contrast. Coronal and sagittal reconstructions were also obtained. Degenerative changes are noted to the thoracolumbar spine.  The heart is normal in size.  There is mucosal prominence at a moderate size hiatus hernia.  Lung bases demonstrate mild atelectasis and/or scarring.  The liver is mildly enlarged.  There is enlargement of the intrahepatic and extrahepatic biliary  ducts.  There is a dilated common bile duct.  There are suspect the small 2-3 mm stones at the distal common bile duct/ pancreatic head.  Multiple small stones seen within the gallbladder.  The spleen, adrenal glands, and pancreas are grossly within normal limits.  The kidneys are relatively symmetric in size.  No hydronephrosis is seen.  Few small lymph nodes are seen within the periaortic and pericaval region.  No dilated loops of bowel are identified.  Feces is scattered throughout the majority of the colon.  The appendix is not identified with certainty.  There is dense feces within the left side of the colon a dense fecal bolus at the rectum.  The bladder is distended with fluid.  The uterus is grossly normal in size.  There is a suspect small follicle at the left ovary.  No free fluid collection is seen.     1. Mild hepatomegaly with dilated intrahepatic and extrahepatic biliary ducts with suspect 2-3 mm stones at the distal common bile duct/pancreatic head.  MRCP examination would allow further evaluation 2. Cholelithiasis 3. Moderate size hiatus hernia with mucosal thickening 4. Findings of constipation with dense feces at the left side of the colon including a dense fecal bolus at the rectum measuring up the 7 cm in diameter 5. Findings and other details as above Electronically signed by: Syed Parkinson Date:    03/31/2025 Time:    13:39      Assessment / Plan:     45 yo F unknown to our group with a PMH of bipolar, schizophrenia, dyslipidemia, obesity.  Here with elevated LFTs and abnormal CT concerning for choledocholithiasis.    Elevated LFTs  Biliary dilation, intra/extrahepatic  Cholelithiasis   RUQ US noted cholelithiasis, CBD not confidently visualized, mild hepatomegaly.   - f/u MRCP. Will determine need for ERCP.   - trend LFTs  - hold lovenox today    4.  Constipation  5.  Fecal impaction   - enema now   - start linzess       Thank you for allowing us to participate in this patient's  care.      ADDENDUM at 10:07AM    MRCP: Intra and extrahepatic biliary ductal dilatation with a distal common bile duct filling defect compatible with choledocholithiasis.     - Keep NPO.  ERCP today.        [1]   Current Facility-Administered Medications   Medication Dose Route Frequency Provider Last Rate Last Admin    acetaminophen tablet 1,000 mg  1,000 mg Oral Q6H PRN Sylvia Muñoz, FNP        aluminum-magnesium hydroxide-simethicone 200-200-20 mg/5 mL suspension 30 mL  30 mL Oral QID PRN Sylvia Muñoz, FNP        bisacodyL suppository 10 mg  10 mg Rectal Daily PRN Sylvia Muñoz, FNP        dextrose 50% injection 12.5 g  12.5 g Intravenous PRN Sylvia Muñoz, FNP        dextrose 50% injection 25 g  25 g Intravenous PRN Sylvia Muñoz, FNP        enoxaparin injection 40 mg  40 mg Subcutaneous Daily Sylvia Muñoz, FNP        FLUoxetine capsule 20 mg  20 mg Oral Daily Sylvia Muñoz, FNP        glucagon (human recombinant) injection 1 mg  1 mg Intramuscular PRN Sylvia Muñoz, FNP        glucose chewable tablet 16 g  16 g Oral PRN Sylvia Muñoz, FNP        glucose chewable tablet 24 g  24 g Oral PRN Sylvia Muñoz, FNP        melatonin tablet 6 mg  6 mg Oral Nightly PRN Sylvia Muñoz, FNP        naloxone 0.4 mg/mL injection 0.02 mg  0.02 mg Intravenous PRN Sylvia Muñoz, FNP        OLANZapine tablet 15 mg  15 mg Oral QHS Sylvia Muñoz, FNP        ondansetron injection 4 mg  4 mg Intravenous Q4H PRN Sylvia Muñoz, FNP        potassium chloride SA CR tablet 40 mEq  40 mEq Oral BID Christophe Goldsmith MD        prochlorperazine injection Soln 5 mg  5 mg Intravenous Q6H PRN Sylvia Muñoz, FNP        senna-docusate 8.6-50 mg per tablet 1 tablet  1 tablet Oral BID PRN Sylvia Muñoz, FNP        sodium chloride 0.9% flush 10 mL  10 mL Intravenous PRN Sylvia Muñoz, FNP       [2]   Medications Prior to Admission   Medication Sig Dispense Refill Last Dose/Taking     FLUoxetine 20 MG capsule Take 20 mg by mouth once daily.   3/31/2025    hydroCHLOROthiazide (HYDRODIURIL) 25 MG tablet Take 25 mg by mouth.   3/31/2025    OLANZapine (ZYPREXA) 15 MG Tab Take 20 mg by mouth every evening.   Past Week    pravastatin (PRAVACHOL) 20 MG tablet Take 20 mg by mouth every evening.   Past Week

## 2025-04-01 NOTE — CONSULTS
"   Acute Care Surgery   Progress Note  Admit Date: 3/31/2025  HD#1  POD#* No surgery found *    Subjective:   Interval history:  No acute events overnight   Afebrile vital signs stable   T bili increasing 1.5  Liver enzymes stable  Denies abdominal pain, nausea, vomiting    Home Meds:  Current Outpatient Medications   Medication Instructions    FLUoxetine 20 mg, Daily    hydroCHLOROthiazide (HYDRODIURIL) 25 mg    OLANZapine (ZYPREXA) 20 mg, Oral, Nightly    pravastatin (PRAVACHOL) 20 mg, Nightly      Scheduled Meds:   enoxparin  40 mg Subcutaneous Daily    FLUoxetine  20 mg Oral Daily    [START ON 4/2/2025] linaCLOtide  290 mcg Oral Before breakfast    OLANZapine  15 mg Oral QHS    potassium chloride  40 mEq Oral BID     Continuous Infusions:  PRN Meds:  Current Facility-Administered Medications:     acetaminophen, 1,000 mg, Oral, Q6H PRN    aluminum-magnesium hydroxide-simethicone, 30 mL, Oral, QID PRN    bisacodyL, 10 mg, Rectal, Daily PRN    dextrose 50%, 12.5 g, Intravenous, PRN    dextrose 50%, 25 g, Intravenous, PRN    glucagon (human recombinant), 1 mg, Intramuscular, PRN    glucose, 16 g, Oral, PRN    glucose, 24 g, Oral, PRN    melatonin, 6 mg, Oral, Nightly PRN    naloxone, 0.02 mg, Intravenous, PRN    ondansetron, 4 mg, Intravenous, Q4H PRN    prochlorperazine, 5 mg, Intravenous, Q6H PRN    senna-docusate, 1 tablet, Oral, BID PRN    sodium chloride 0.9%, 10 mL, Intravenous, PRN     Objective:     VITAL SIGNS: 24 HR MIN & MAX LAST   Temp  Min: 97.4 °F (36.3 °C)  Max: 98.7 °F (37.1 °C)  97.6 °F (36.4 °C)   BP  Min: 117/70  Max: 149/92  127/76    Pulse  Min: 72  Max: 94  73    Resp  Min: 14  Max: 20  18    SpO2  Min: 96 %  Max: 99 %  99 %      HT: 5' 4" (162.6 cm)  WT: 109.1 kg (240 lb 8 oz)  BMI: 41.3     Intake/output:  Intake/Output - Last 3 Shifts         03/30 0700 03/31 0659 03/31 0700 04/01 0659 04/01 0700  04/02 0659           Urine Occurrence  1 x           No intake or output data in the 24 hours " "ending 04/01/25 1120      Lines/drains/airway:       Peripheral IV - Single Lumen 03/31/25 1854 20 G Anterior;Right Forearm (Active)   Site Assessment Clean;Dry;Intact;No swelling;No redness;No warmth;No drainage 04/01/25 0356   Line Securement Device Antimicrobial Adhesive 03/31/25 2115   Extremity Assessment Distal to IV No abnormal discoloration;No redness;No swelling;No warmth 03/31/25 2115   Line Status Capped 04/01/25 0356   Dressing Status Clean;Dry;Intact 04/01/25 0356   Dressing Intervention Integrity maintained 04/01/25 0356   Number of days: 0       Physical examination:  Gen: NAD, AAOx3, answering questions appropriately  HEENT: NC  CV: RR  Resp: NWOB  Abd: S/NT/ND   Ext: moving all extremities spontaneously and purposefully  Neuro: CN II-XII grossly intact    Labs:  Renal:  Recent Labs     03/31/25 1619 04/01/25 0459   BUN 9.0 6.4*   CREATININE 0.72 0.64     No results for input(s): "LACTIC" in the last 72 hours.  FENGI:  Recent Labs     03/31/25 1619 04/01/25 0459    139   K 3.5 3.0*   CL 99 103   CO2 25 25   CALCIUM 9.2 9.0   MG  --  1.80   ALBUMIN 3.0* 3.1*   BILITOT 1.3 1.5   * 168*   ALKPHOS 655* 640*   * 256*     Heme:  Recent Labs     03/31/25 1619 04/01/25 0459   HGB 11.8* 11.9*   HCT 36.5* 35.2*   * 444*     ID:  Recent Labs     03/31/25 1619 04/01/25 0459   WBC 13.90* 14.56*     CBG:  Recent Labs     03/31/25 1619 04/01/25  0459   GLUCOSE 102* 115*      Cardiovascular:  No results for input(s): "TROPONINI", "CKTOTAL", "CKMB", "BNP" in the last 168 hours.  ABG:  No results for input(s): "PH", "PO2", "PCO2", "HCO3", "BE" in the last 168 hours.   I have reviewed all pertinent lab results within the past 24 hours.    Imaging:  MRI MRCP Abdomen WO Contrast 3D WO Independent WS XPD   Final Result      1. Cholelithiasis without convincing MRI evidence of acute cholecystitis.   2. Intra and extrahepatic biliary ductal dilatation with a distal common bile duct " filling defect compatible with choledocholithiasis.         Electronically signed by: Joon Bone   Date:    04/01/2025   Time:    09:19      US Abdomen Limited   Final Result      Cholelithiasis with positive sonographic Pearl sign, but no significant ascites or gallbladder wall thickening.      Common bile duct not confidently visualized on this exam.      Mild hepatomegaly.         Electronically signed by: Shailesh White   Date:    03/31/2025   Time:    16:57         I have reviewed all pertinent imaging results/findings within the past 24 hours.    Micro/Path/Other:  Microbiology Results (last 7 days)       ** No results found for the last 168 hours. **           Specimens (From admission, onward)      None           Pathology Results  (Last 365 days)      None             Assessment & Plan:   Choledocholithiasis     - NPO  - mIVF  - MRCP read pending  - daily labs to trend LFTs  - GI following  - will continue to follow and discuss cholecystectomy timing   - Rest of care per primary team      4/1/2025     The above findings, diagnostics, and treatment plan were discussed with Dr. Russell Avalos who will follow with further assessments and recommendations. Please call with any questions, concerns, or clinical status changes.  This note/OR report was created with the assistance of  voice recognition software or phone  dictation.  There may be transcription errors as a result of using this technology however minimal. Effort has been made to assure accuracy of transcription but any obvious errors or omissions should be clarified with the author of the document.

## 2025-04-01 NOTE — TRANSFER OF CARE
Anesthesia Transfer of Care Note    Patient: Martine Nina    Procedure(s) Performed: Procedure(s) (LRB):  ERCP (N/A)  ERCP, WITH SPHINCTEROTOMY  ERCP, WITH CALCULUS REMOVAL (N/A)    Patient location: GI    Anesthesia Type: general    Transport from OR: Transported from OR on room air with adequate spontaneous ventilation    Post pain: adequate analgesia    Post assessment: no apparent anesthetic complications    Post vital signs: stable    Level of consciousness: responds to stimulation    Nausea/Vomiting: no nausea/vomiting    Complications: none    Transfer of care protocol was followed

## 2025-04-01 NOTE — CONSULTS
"   Acute Care Surgery   Consult    Patient Name: Martine Nina  YOB: 1980  Date: 03/31/2025 10:19 PM  Date of Admission: 3/31/2025  HD#0  POD#* No surgery found *    PRESENTING HISTORY   Chief Complaint/Reason for Admission: <principal problem not specified>  History source(s): patient and caregiver of 6 years, Saad  History of Present Illness:  44F w/bipolar disorder, schizophrenia, and HLD who presents with caretaker after outpatient labwork from PCP showed elevated LFT's. Of note, caretaker reports that about 3 weeks prior, patient was experiencing post-prandial nausea and emesis after "heavy meals" and meals with rice. This had since resolved over the past week. Of note, patient and caretaker report a 17lb unintentional weight loss during this time. Denies blood thinners, prior abdominal surgeries. Denies any current symptoms of fever, chills, nausea, or abdominal pain.     Review of Systems:  12 point ROS negative except as stated in HPI    PAST HISTORY:   Past medical history:  Past Medical History:   Diagnosis Date    Bipolar disorder, unspecified     Depression     Hypercholesteremia     Schizophrenia, unspecified        Past surgical history:  No past surgical history on file.    Family history:  No family history on file.    Social history:  Social History     Socioeconomic History    Marital status: Single   Tobacco Use    Smoking status: Never    Smokeless tobacco: Never   Substance and Sexual Activity    Alcohol use: Never    Drug use: Never     Tobacco Use History[1]   Social History     Substance and Sexual Activity   Alcohol Use Never        MEDICATIONS & ALLERGIES:     Current Facility-Administered Medications on File Prior to Encounter   Medication    [COMPLETED] iopamidoL (ISOVUE-370) injection 100 mL     Current Outpatient Medications on File Prior to Encounter   Medication Sig    FLUoxetine 20 MG capsule Take 20 mg by mouth once daily.    hydroCHLOROthiazide (HYDRODIURIL) 25 MG " "tablet Take 25 mg by mouth.    OLANZapine (ZYPREXA) 15 MG Tab Take 15 mg by mouth every evening.    pravastatin (PRAVACHOL) 20 MG tablet Take 20 mg by mouth every evening.    [DISCONTINUED] paliperidone (INVEGA) 6 MG TR24 Take 3 mg by mouth once daily.    [DISCONTINUED] sertraline (ZOLOFT) 100 MG tablet Take 200 mg by mouth every morning.    [DISCONTINUED] traZODone (DESYREL) 150 MG tablet Take 300 mg by mouth nightly.    [DISCONTINUED] VRAYLAR 1.5 mg Cap Take 1.5 mg by mouth once daily.    [DISCONTINUED] ziprasidone (GEODON) 60 MG Cap Take 140 mg by mouth nightly.     Allergies:   Review of patient's allergies indicates:   Allergen Reactions    Hydrocodone-acetaminophen      Scheduled Meds:   [START ON 4/1/2025] enoxparin  40 mg Subcutaneous Daily    [START ON 4/1/2025] FLUoxetine  20 mg Oral Daily    [START ON 4/1/2025] OLANZapine  15 mg Oral QHS     Continuous Infusions:  PRN Meds:  Current Facility-Administered Medications:     acetaminophen, 1,000 mg, Oral, Q6H PRN    aluminum-magnesium hydroxide-simethicone, 30 mL, Oral, QID PRN    bisacodyL, 10 mg, Rectal, Daily PRN    dextrose 50%, 12.5 g, Intravenous, PRN    dextrose 50%, 25 g, Intravenous, PRN    glucagon (human recombinant), 1 mg, Intramuscular, PRN    glucose, 16 g, Oral, PRN    glucose, 24 g, Oral, PRN    melatonin, 6 mg, Oral, Nightly PRN    naloxone, 0.02 mg, Intravenous, PRN    ondansetron, 4 mg, Intravenous, Q4H PRN    prochlorperazine, 5 mg, Intravenous, Q6H PRN    senna-docusate 8.6-50 mg, 1 tablet, Oral, BID PRN    sodium chloride 0.9%, 10 mL, Intravenous, PRN    OBJECTIVE:   Vital Signs:  VITAL SIGNS: 24 HR MIN & MAX LAST   Temp  Min: 98.2 °F (36.8 °C)  Max: 98.7 °F (37.1 °C)  98.7 °F (37.1 °C)   BP  Min: 140/93  Max: 149/92  (!) 146/96    Pulse  Min: 74  Max: 94  79    Resp  Min: 14  Max: 20  17    SpO2  Min: 98 %  Max: 99 %  99 %      HT: 5' 5" (165.1 cm)  WT: 109.3 kg (241 lb)  BMI: 40.1     Intake/output:  Intake/Output - Last 3 Shifts       " "None          No intake or output data in the 24 hours ending 03/31/25 7928      Physical Exam:  General: Well developed, well nourished, no acute distress  HEENT: Normocephalic, PERRL  CV: RR  Resp: NWOB  GI:  Abdomen soft, non-tender, non-distended, no guarding, no rebound. Negative pearl's sign.  :  Deferred  MSK: No muscle atrophy, cyanosis, peripheral edema, moving all extremities spontaneously  Skin/wounds:  No rashes, ulcers, erythema  Neuro:  CNII-XII grossly intact, alert and oriented to person, place, and time    Labs:  Troponin:  No results for input(s): "TROPONINI" in the last 72 hours.  CBC:  Recent Labs     03/31/25  1619   WBC 13.90*   RBC 4.54   HGB 11.8*   HCT 36.5*   *   MCV 80.4   MCH 26.0*   MCHC 32.3*     CMP:  Recent Labs     03/31/25  1619   CALCIUM 9.2   ALBUMIN 3.0*      K 3.5   CO2 25   CL 99   BUN 9.0   CREATININE 0.72   ALKPHOS 655*   *   *   BILITOT 1.3     Lactic Acid:  No results for input(s): "LACTATE" in the last 72 hours.  ETOH:  No results for input(s): "ETHANOL" in the last 72 hours.   Urine Drug Screen:  No results for input(s): "COCAINE", "OPIATE", "BARBITURATE", "AMPHETAMINE", "FENTANYL", "CANNABINOIDS", "MDMA" in the last 72 hours.    Invalid input(s): "BENZODIAZEPINE", "PHENCYCLIDINE"   ABG:  No results for input(s): "PH", "PO2", "PCO2", "HCO3", "BE" in the last 168 hours.   I have reviewed all pertinent lab results within the past 24 hours.    Diagnostic Results:  Imaging Results              US Abdomen Limited (Final result)  Result time 03/31/25 16:57:21      Final result by Shailesh White MD (03/31/25 16:57:21)                   Impression:      Cholelithiasis with positive sonographic Pearl sign, but no significant ascites or gallbladder wall thickening.    Common bile duct not confidently visualized on this exam.    Mild hepatomegaly.      Electronically signed by: Shailesh White  Date:    03/31/2025  Time:    16:57               " Narrative:    EXAMINATION:  US ABDOMEN LIMITED    CLINICAL HISTORY:  Cholelithiasis;    TECHNIQUE:  Gray-scale and color Doppler ultrasound images of the abdomen.    COMPARISON:  CT 03/31/2025    FINDINGS:  Poor visualization of the pancreas and IVC.    Liver mildly enlarged.  Main portal vein patent with normal flow direction.  Common bile duct not confidently seen.  Cholelithiasis.  No gallbladder wall thickening or significant ascites.  Positive sonographic Pearl sign.    No right-sided hydronephrosis.    Measurements:    - Liver: 18.8 cm    - Right kidney: 7.5 cm in length                                     I have reviewed all pertinent imaging results/findings within the past 24 hours.    ASSESSMENT & PLAN:    44F w/Hx bipolar, schizophrenia, HLD presents with elevated LFT's on outpatient labwork and imaging findings concerning for choledocholithiasis w/o cholecystitis. WBC 13. T Bili 1.3 Ast/Alt 200's.     Agree with admission to medicine  Trend LFTs   Agree with consult to gastroenterology, possible MRCP vs ERCP   Surgery will continue to follow for eventual cholecystectomy within this admission.   Rest of care per primary team    Zainab Ibrahim MD  [employer] General Surgery, PGY-2         [1]   Social History  Tobacco Use   Smoking Status Never   Smokeless Tobacco Never

## 2025-04-02 ENCOUNTER — ANESTHESIA (OUTPATIENT)
Dept: SURGERY | Facility: HOSPITAL | Age: 45
End: 2025-04-02
Payer: MEDICARE

## 2025-04-02 ENCOUNTER — ANESTHESIA EVENT (OUTPATIENT)
Dept: SURGERY | Facility: HOSPITAL | Age: 45
End: 2025-04-02
Payer: MEDICARE

## 2025-04-02 LAB
ALBUMIN SERPL-MCNC: 3 G/DL (ref 3.5–5)
ALBUMIN/GLOB SERPL: 0.7 RATIO (ref 1.1–2)
ALP SERPL-CCNC: 589 UNIT/L (ref 40–150)
ALT SERPL-CCNC: 226 UNIT/L (ref 0–55)
ANION GAP SERPL CALC-SCNC: 7 MEQ/L
AST SERPL-CCNC: 128 UNIT/L (ref 11–45)
BILIRUB SERPL-MCNC: 1.1 MG/DL
BUN SERPL-MCNC: 5.8 MG/DL (ref 7–18.7)
CALCIUM SERPL-MCNC: 9 MG/DL (ref 8.4–10.2)
CHLORIDE SERPL-SCNC: 108 MMOL/L (ref 98–107)
CO2 SERPL-SCNC: 26 MMOL/L (ref 22–29)
CREAT SERPL-MCNC: 0.65 MG/DL (ref 0.55–1.02)
CREAT/UREA NIT SERPL: 9
ERYTHROCYTE [DISTWIDTH] IN BLOOD BY AUTOMATED COUNT: 21.9 % (ref 11.5–17)
GFR SERPLBLD CREATININE-BSD FMLA CKD-EPI: >60 ML/MIN/1.73/M2
GLOBULIN SER-MCNC: 4.6 GM/DL (ref 2.4–3.5)
GLUCOSE SERPL-MCNC: 88 MG/DL (ref 74–100)
HCT VFR BLD AUTO: 38 % (ref 37–47)
HGB BLD-MCNC: 12.2 G/DL (ref 12–16)
MAGNESIUM SERPL-MCNC: 2 MG/DL (ref 1.6–2.6)
MCH RBC QN AUTO: 26 PG (ref 27–31)
MCHC RBC AUTO-ENTMCNC: 32.1 G/DL (ref 33–36)
MCV RBC AUTO: 81 FL (ref 80–94)
NRBC BLD AUTO-RTO: 0 %
PLATELET # BLD AUTO: 405 X10(3)/MCL (ref 130–400)
PMV BLD AUTO: 11.1 FL (ref 7.4–10.4)
POTASSIUM SERPL-SCNC: 4.1 MMOL/L (ref 3.5–5.1)
PROT SERPL-MCNC: 7.6 GM/DL (ref 6.4–8.3)
RBC # BLD AUTO: 4.69 X10(6)/MCL (ref 4.2–5.4)
SODIUM SERPL-SCNC: 141 MMOL/L (ref 136–145)
WBC # BLD AUTO: 12.42 X10(3)/MCL (ref 4.5–11.5)

## 2025-04-02 PROCEDURE — 25000003 PHARM REV CODE 250: Performed by: STUDENT IN AN ORGANIZED HEALTH CARE EDUCATION/TRAINING PROGRAM

## 2025-04-02 PROCEDURE — 27201423 OPTIME MED/SURG SUP & DEVICES STERILE SUPPLY: Performed by: STUDENT IN AN ORGANIZED HEALTH CARE EDUCATION/TRAINING PROGRAM

## 2025-04-02 PROCEDURE — 63600175 PHARM REV CODE 636 W HCPCS: Performed by: ANESTHESIOLOGY

## 2025-04-02 PROCEDURE — 36000710: Performed by: STUDENT IN AN ORGANIZED HEALTH CARE EDUCATION/TRAINING PROGRAM

## 2025-04-02 PROCEDURE — 83735 ASSAY OF MAGNESIUM: CPT | Performed by: STUDENT IN AN ORGANIZED HEALTH CARE EDUCATION/TRAINING PROGRAM

## 2025-04-02 PROCEDURE — 63600175 PHARM REV CODE 636 W HCPCS

## 2025-04-02 PROCEDURE — 63600175 PHARM REV CODE 636 W HCPCS: Performed by: STUDENT IN AN ORGANIZED HEALTH CARE EDUCATION/TRAINING PROGRAM

## 2025-04-02 PROCEDURE — 25000003 PHARM REV CODE 250: Performed by: NURSE PRACTITIONER

## 2025-04-02 PROCEDURE — 25000003 PHARM REV CODE 250

## 2025-04-02 PROCEDURE — 88304 TISSUE EXAM BY PATHOLOGIST: CPT | Performed by: STUDENT IN AN ORGANIZED HEALTH CARE EDUCATION/TRAINING PROGRAM

## 2025-04-02 PROCEDURE — 8E0W4CZ ROBOTIC ASSISTED PROCEDURE OF TRUNK REGION, PERCUTANEOUS ENDOSCOPIC APPROACH: ICD-10-PCS | Performed by: STUDENT IN AN ORGANIZED HEALTH CARE EDUCATION/TRAINING PROGRAM

## 2025-04-02 PROCEDURE — 11000001 HC ACUTE MED/SURG PRIVATE ROOM

## 2025-04-02 PROCEDURE — 99233 SBSQ HOSP IP/OBS HIGH 50: CPT | Mod: 57,GC,ICN, | Performed by: STUDENT IN AN ORGANIZED HEALTH CARE EDUCATION/TRAINING PROGRAM

## 2025-04-02 PROCEDURE — 47562 LAPAROSCOPIC CHOLECYSTECTOMY: CPT | Mod: ,,, | Performed by: STUDENT IN AN ORGANIZED HEALTH CARE EDUCATION/TRAINING PROGRAM

## 2025-04-02 PROCEDURE — 0FT44ZZ RESECTION OF GALLBLADDER, PERCUTANEOUS ENDOSCOPIC APPROACH: ICD-10-PCS | Performed by: STUDENT IN AN ORGANIZED HEALTH CARE EDUCATION/TRAINING PROGRAM

## 2025-04-02 PROCEDURE — 85027 COMPLETE CBC AUTOMATED: CPT

## 2025-04-02 PROCEDURE — 71000033 HC RECOVERY, INTIAL HOUR: Performed by: STUDENT IN AN ORGANIZED HEALTH CARE EDUCATION/TRAINING PROGRAM

## 2025-04-02 PROCEDURE — 36415 COLL VENOUS BLD VENIPUNCTURE: CPT

## 2025-04-02 PROCEDURE — 80053 COMPREHEN METABOLIC PANEL: CPT

## 2025-04-02 PROCEDURE — 36000711: Performed by: STUDENT IN AN ORGANIZED HEALTH CARE EDUCATION/TRAINING PROGRAM

## 2025-04-02 PROCEDURE — 37000009 HC ANESTHESIA EA ADD 15 MINS: Performed by: STUDENT IN AN ORGANIZED HEALTH CARE EDUCATION/TRAINING PROGRAM

## 2025-04-02 PROCEDURE — 37000008 HC ANESTHESIA 1ST 15 MINUTES: Performed by: STUDENT IN AN ORGANIZED HEALTH CARE EDUCATION/TRAINING PROGRAM

## 2025-04-02 RX ORDER — DIPHENHYDRAMINE HYDROCHLORIDE 50 MG/ML
25 INJECTION, SOLUTION INTRAMUSCULAR; INTRAVENOUS EVERY 6 HOURS PRN
Status: DISCONTINUED | OUTPATIENT
Start: 2025-04-02 | End: 2025-04-02 | Stop reason: HOSPADM

## 2025-04-02 RX ORDER — LIDOCAINE HYDROCHLORIDE 20 MG/ML
INJECTION INTRAVENOUS
Status: DISCONTINUED | OUTPATIENT
Start: 2025-04-02 | End: 2025-04-02

## 2025-04-02 RX ORDER — LIDOCAINE HYDROCHLORIDE 10 MG/ML
1 INJECTION, SOLUTION EPIDURAL; INFILTRATION; INTRACAUDAL; PERINEURAL ONCE
Status: DISCONTINUED | OUTPATIENT
Start: 2025-04-02 | End: 2025-04-02 | Stop reason: HOSPADM

## 2025-04-02 RX ORDER — HYDROMORPHONE HYDROCHLORIDE 2 MG/ML
INJECTION, SOLUTION INTRAMUSCULAR; INTRAVENOUS; SUBCUTANEOUS
Status: DISCONTINUED | OUTPATIENT
Start: 2025-04-02 | End: 2025-04-02

## 2025-04-02 RX ORDER — FENTANYL CITRATE 50 UG/ML
INJECTION, SOLUTION INTRAMUSCULAR; INTRAVENOUS
Status: DISCONTINUED | OUTPATIENT
Start: 2025-04-02 | End: 2025-04-02

## 2025-04-02 RX ORDER — SODIUM CHLORIDE, SODIUM GLUCONATE, SODIUM ACETATE, POTASSIUM CHLORIDE AND MAGNESIUM CHLORIDE 30; 37; 368; 526; 502 MG/100ML; MG/100ML; MG/100ML; MG/100ML; MG/100ML
INJECTION, SOLUTION INTRAVENOUS CONTINUOUS
Status: DISCONTINUED | OUTPATIENT
Start: 2025-04-02 | End: 2025-04-04 | Stop reason: HOSPADM

## 2025-04-02 RX ORDER — ACETAMINOPHEN 10 MG/ML
INJECTION, SOLUTION INTRAVENOUS
Status: DISCONTINUED | OUTPATIENT
Start: 2025-04-02 | End: 2025-04-02

## 2025-04-02 RX ORDER — OXYCODONE HYDROCHLORIDE 10 MG/1
10 TABLET ORAL EVERY 6 HOURS PRN
Refills: 0 | Status: DISCONTINUED | OUTPATIENT
Start: 2025-04-02 | End: 2025-04-04 | Stop reason: HOSPADM

## 2025-04-02 RX ORDER — HYDROMORPHONE HYDROCHLORIDE 2 MG/ML
0.4 INJECTION, SOLUTION INTRAMUSCULAR; INTRAVENOUS; SUBCUTANEOUS EVERY 5 MIN PRN
Status: DISCONTINUED | OUTPATIENT
Start: 2025-04-02 | End: 2025-04-02 | Stop reason: HOSPADM

## 2025-04-02 RX ORDER — CEFAZOLIN SODIUM 1 G/3ML
INJECTION, POWDER, FOR SOLUTION INTRAMUSCULAR; INTRAVENOUS
Status: DISCONTINUED | OUTPATIENT
Start: 2025-04-02 | End: 2025-04-02

## 2025-04-02 RX ORDER — SODIUM CHLORIDE, SODIUM LACTATE, POTASSIUM CHLORIDE, CALCIUM CHLORIDE 600; 310; 30; 20 MG/100ML; MG/100ML; MG/100ML; MG/100ML
INJECTION, SOLUTION INTRAVENOUS CONTINUOUS
Status: DISCONTINUED | OUTPATIENT
Start: 2025-04-02 | End: 2025-04-04 | Stop reason: HOSPADM

## 2025-04-02 RX ORDER — PHENYLEPHRINE HCL IN 0.9% NACL 1 MG/10 ML
SYRINGE (ML) INTRAVENOUS
Status: DISCONTINUED | OUTPATIENT
Start: 2025-04-02 | End: 2025-04-02

## 2025-04-02 RX ORDER — OXYCODONE HYDROCHLORIDE 5 MG/1
5 TABLET ORAL EVERY 6 HOURS PRN
Refills: 0 | Status: DISCONTINUED | OUTPATIENT
Start: 2025-04-02 | End: 2025-04-04 | Stop reason: HOSPADM

## 2025-04-02 RX ORDER — ONDANSETRON HYDROCHLORIDE 2 MG/ML
4 INJECTION, SOLUTION INTRAVENOUS DAILY PRN
Status: DISCONTINUED | OUTPATIENT
Start: 2025-04-02 | End: 2025-04-02 | Stop reason: HOSPADM

## 2025-04-02 RX ORDER — GLUCAGON 1 MG
1 KIT INJECTION
Status: DISCONTINUED | OUTPATIENT
Start: 2025-04-02 | End: 2025-04-02 | Stop reason: HOSPADM

## 2025-04-02 RX ORDER — PANTOPRAZOLE SODIUM 40 MG/1
40 TABLET, DELAYED RELEASE ORAL
Status: DISCONTINUED | OUTPATIENT
Start: 2025-04-02 | End: 2025-04-04 | Stop reason: HOSPADM

## 2025-04-02 RX ORDER — ONDANSETRON HYDROCHLORIDE 2 MG/ML
INJECTION, SOLUTION INTRAVENOUS
Status: DISCONTINUED | OUTPATIENT
Start: 2025-04-02 | End: 2025-04-02

## 2025-04-02 RX ORDER — ROCURONIUM BROMIDE 10 MG/ML
INJECTION, SOLUTION INTRAVENOUS
Status: DISCONTINUED | OUTPATIENT
Start: 2025-04-02 | End: 2025-04-02

## 2025-04-02 RX ORDER — DEXAMETHASONE SODIUM PHOSPHATE 4 MG/ML
INJECTION, SOLUTION INTRA-ARTICULAR; INTRALESIONAL; INTRAMUSCULAR; INTRAVENOUS; SOFT TISSUE
Status: DISCONTINUED | OUTPATIENT
Start: 2025-04-02 | End: 2025-04-02

## 2025-04-02 RX ORDER — INDOCYANINE GREEN AND WATER 25 MG
2.5 KIT INJECTION ONCE
Status: COMPLETED | OUTPATIENT
Start: 2025-04-02 | End: 2025-04-02

## 2025-04-02 RX ORDER — MIDAZOLAM HYDROCHLORIDE 2 MG/2ML
2 INJECTION, SOLUTION INTRAMUSCULAR; INTRAVENOUS ONCE AS NEEDED
Status: COMPLETED | OUTPATIENT
Start: 2025-04-02 | End: 2025-04-02

## 2025-04-02 RX ORDER — PROPOFOL 10 MG/ML
VIAL (ML) INTRAVENOUS
Status: DISCONTINUED | OUTPATIENT
Start: 2025-04-02 | End: 2025-04-02

## 2025-04-02 RX ADMIN — SODIUM CHLORIDE, SODIUM GLUCONATE, SODIUM ACETATE, POTASSIUM CHLORIDE AND MAGNESIUM CHLORIDE: 526; 502; 368; 37; 30 INJECTION, SOLUTION INTRAVENOUS at 09:04

## 2025-04-02 RX ADMIN — CEFAZOLIN 3 G: 330 INJECTION, POWDER, FOR SOLUTION INTRAMUSCULAR; INTRAVENOUS at 08:04

## 2025-04-02 RX ADMIN — LIDOCAINE HYDROCHLORIDE 80 MG: 20 INJECTION INTRAVENOUS at 07:04

## 2025-04-02 RX ADMIN — FENTANYL CITRATE 50 MCG: 50 INJECTION, SOLUTION INTRAMUSCULAR; INTRAVENOUS at 07:04

## 2025-04-02 RX ADMIN — Medication 50 MCG: at 08:04

## 2025-04-02 RX ADMIN — MIDAZOLAM HYDROCHLORIDE 2 MG: 1 INJECTION, SOLUTION INTRAMUSCULAR; INTRAVENOUS at 07:04

## 2025-04-02 RX ADMIN — HYDROMORPHONE HYDROCHLORIDE 0.5 MG: 2 INJECTION, SOLUTION INTRAMUSCULAR; INTRAVENOUS; SUBCUTANEOUS at 09:04

## 2025-04-02 RX ADMIN — Medication 100 MCG: at 08:04

## 2025-04-02 RX ADMIN — HYDROMORPHONE HYDROCHLORIDE 0.5 MG: 2 INJECTION, SOLUTION INTRAMUSCULAR; INTRAVENOUS; SUBCUTANEOUS at 08:04

## 2025-04-02 RX ADMIN — INDOCYANINE GREEN AND WATER 2.5 MG: KIT at 07:04

## 2025-04-02 RX ADMIN — DEXAMETHASONE SODIUM PHOSPHATE 8 MG: 4 INJECTION, SOLUTION INTRA-ARTICULAR; INTRALESIONAL; INTRAMUSCULAR; INTRAVENOUS; SOFT TISSUE at 08:04

## 2025-04-02 RX ADMIN — HYDROMORPHONE HYDROCHLORIDE 0.5 MG: 2 INJECTION, SOLUTION INTRAMUSCULAR; INTRAVENOUS; SUBCUTANEOUS at 10:04

## 2025-04-02 RX ADMIN — ACETAMINOPHEN 1000 MG: 10 INJECTION, SOLUTION INTRAVENOUS at 11:04

## 2025-04-02 RX ADMIN — Medication 100 MCG: at 10:04

## 2025-04-02 RX ADMIN — Medication 100 MCG: at 11:04

## 2025-04-02 RX ADMIN — OLANZAPINE 15 MG: 5 TABLET, FILM COATED ORAL at 08:04

## 2025-04-02 RX ADMIN — SODIUM CHLORIDE, SODIUM GLUCONATE, SODIUM ACETATE, POTASSIUM CHLORIDE AND MAGNESIUM CHLORIDE: 526; 502; 368; 37; 30 INJECTION, SOLUTION INTRAVENOUS at 07:04

## 2025-04-02 RX ADMIN — HYDROMORPHONE HYDROCHLORIDE 0.5 MG: 2 INJECTION, SOLUTION INTRAMUSCULAR; INTRAVENOUS; SUBCUTANEOUS at 11:04

## 2025-04-02 RX ADMIN — FLUOXETINE HYDROCHLORIDE 20 MG: 20 CAPSULE ORAL at 02:04

## 2025-04-02 RX ADMIN — ROCURONIUM BROMIDE 20 MG: 10 SOLUTION INTRAVENOUS at 08:04

## 2025-04-02 RX ADMIN — ONDANSETRON 4 MG: 2 INJECTION INTRAMUSCULAR; INTRAVENOUS at 11:04

## 2025-04-02 RX ADMIN — PROPOFOL 150 MG: 10 INJECTION, EMULSION INTRAVENOUS at 07:04

## 2025-04-02 RX ADMIN — SUGAMMADEX 200 MG: 100 INJECTION, SOLUTION INTRAVENOUS at 11:04

## 2025-04-02 RX ADMIN — ROCURONIUM BROMIDE 20 MG: 10 SOLUTION INTRAVENOUS at 09:04

## 2025-04-02 RX ADMIN — OXYCODONE HYDROCHLORIDE 5 MG: 5 TABLET ORAL at 06:04

## 2025-04-02 RX ADMIN — ROCURONIUM BROMIDE 60 MG: 10 SOLUTION INTRAVENOUS at 07:04

## 2025-04-02 RX ADMIN — Medication 200 MCG: at 11:04

## 2025-04-02 RX ADMIN — Medication 100 MCG: at 09:04

## 2025-04-02 RX ADMIN — PANTOPRAZOLE SODIUM 40 MG: 40 TABLET, DELAYED RELEASE ORAL at 04:04

## 2025-04-02 RX ADMIN — Medication 150 MCG: at 08:04

## 2025-04-02 RX ADMIN — ROCURONIUM BROMIDE 10 MG: 10 SOLUTION INTRAVENOUS at 10:04

## 2025-04-02 RX ADMIN — SODIUM CHLORIDE, POTASSIUM CHLORIDE, SODIUM LACTATE AND CALCIUM CHLORIDE: 600; 310; 30; 20 INJECTION, SOLUTION INTRAVENOUS at 02:04

## 2025-04-02 RX ADMIN — FENTANYL CITRATE 50 MCG: 50 INJECTION, SOLUTION INTRAMUSCULAR; INTRAVENOUS at 08:04

## 2025-04-02 NOTE — PROGRESS NOTES
"[facility] Hospital Medicine Progress Note        Chief Complaint: Inpatient Follow-up for     HPI:   44 yr old female whose history includes Bipolar disorder, schizophrenia and dyslipidemia. At the time of my exam patient is AAO x 3. Appears comfortable. Her personal home sitter at bedside. Reported having abdominal pain 2 weeks ago which lasted about a week prior to resolving but returned again yesterday. Outpatient labs and CT abdomen/pelvis ordered by PCP which revealed a significant transaminitis and stones in CBD. She was instructed to present to ED for further evaluation. Endorses vomiting after eating a "heavy" meal and a 17 pound weight loss since 3/6/25.     CT abdomen and pelvis with and without contrast showed mild hepatomegaly with dilated intrahepatic and extrahepatic biliary ducts with suspected 2-3 mm stones of the distal common bile duct/pancreatic head, cholelithiasis, moderate-size hiatal hernia with mucosal thickening and findings of constipation with dense feces at the left-sided the colon including a dense fecal bolus at the rectum measuring up to 7 cm in diameter.     VS on arrival: T 98.2, P 74, R 20, B/P 140/93, sats 99% on room air. Initial labs include WBC 13.9 (appears chronic), , , .  Abdominal ultrasound shows cholelithiasis with a positive sonographic Pearl's sign but no significant ascites or gallbladder wall thickening.  Common bile duct not confidently visualized on this exam.  Mild hepatomegaly.    Interval Hx:   Patient was seen and evaluated at bedside, oxygenating well on room air.  Patient was informed that she will be having surgery done today due to gallbladder sludging that was noted by the gastroenterologist.  Patient is aware that the surgery will take place today.  WBC count improved.    Case was discussed with patient's nurse and  on the floor.    Objective/physical exam:  General: In no acute distress, " afebrile  Chest: Clear to auscultation bilaterally  Heart: RRR, +S1, S2, no appreciable murmur  Abdomen: Soft, nontender, BS +  MSK: Warm, no lower extremity edema, no clubbing or cyanosis  Neurologic: Alert and oriented x4, Cranial nerve II-XII intact, Strength 5/5 in all 4 extremities    VITAL SIGNS: 24 HRS MIN & MAX LAST   Temp  Min: 95.5 °F (35.3 °C)  Max: 98.5 °F (36.9 °C) 98.3 °F (36.8 °C)   BP  Min: 114/76  Max: 156/98 (!) 138/94   Pulse  Min: 62  Max: 86  81   Resp  Min: 14  Max: 18 18   SpO2  Min: 94 %  Max: 100 % (!) 94 %     I have reviewed the following labs:  Recent Labs   Lab 03/31/25 1619 04/01/25 0459 04/02/25  0527   WBC 13.90* 14.56* 12.42*   RBC 4.54 4.49 4.69   HGB 11.8* 11.9* 12.2   HCT 36.5* 35.2* 38.0   MCV 80.4 78.4* 81.0   MCH 26.0* 26.5* 26.0*   MCHC 32.3* 33.8 32.1*   RDW 22.3* 22.0* 21.9*   * 444* 405*   MPV 10.7* 10.9* 11.1*     Recent Labs   Lab 03/31/25  1619 04/01/25 0459 04/02/25  0527    139 141   K 3.5 3.0* 4.1   CL 99 103 108*   CO2 25 25 26   BUN 9.0 6.4* 5.8*   CREATININE 0.72 0.64 0.65   CALCIUM 9.2 9.0 9.0   MG  --  1.80 2.00   ALBUMIN 3.0* 3.1* 3.0*   ALKPHOS 655* 640* 589*   * 256* 226*   * 168* 128*   BILITOT 1.3 1.5 1.1     Microbiology Results (last 7 days)       ** No results found for the last 168 hours. **             See below for Radiology    Assessment/Plan:  Choledocholithiasis vs biliary sludging  Abdominal pain   Schizophrenia/Bipolar disorder     -CT abdomen WWO contrast showed cholelithiasis with 2-3 mm stones  -U/S showed a enlarged liver with cholelithiasis without gallbladder thickening  -MRCP shows cholelithiasis without cholecystitis.  Also noted intra and extrahepatic biliary duct dilation with CBD filling defect  -ERCP performed today  -GI and surgery following  -surgery plans for cholecystectomy today  - repeat labs in AM   - home psych resumed    VTE prophylaxis:  SCDs    Patient condition:  Stable    Anticipated discharge  and Disposition:   Home at discharge      All diagnosis and differential diagnosis have been reviewed; assessment and plan has been documented; I have personally reviewed the labs and test results that are presently available; I have reviewed the patients medication list; I have reviewed the consulting providers response and recommendations. I have reviewed or attempted to review medical records based upon their availability    All of the patient's questions have been  addressed and answered. Patient's is agreeable to the above stated plan. I will continue to monitor closely and make adjustments to medical management as needed.    Portions of this note dictated using EMR integrated voice recognition software, and may be subject to voice recognition errors not corrected at proofreading. Please contact writer for clarification if needed.   _____________________________________________________________________    Malnutrition Status:  Nutrition consulted. Most recent weight and BMI monitored-     Measurements:  Wt Readings from Last 1 Encounters:   04/01/25 109.1 kg (240 lb 8 oz)   Body mass index is 41.28 kg/m².    Patient has been screened and assessed by RD.    Malnutrition Type:  Context:    Level:      Malnutrition Characteristic Summary:       Interventions/Recommendations (treatment strategy):        Scheduled Med:   FLUoxetine  20 mg Oral Daily    indomethacin  100 mg Rectal Once    LIDOcaine (PF) 10 mg/ml (1%)  1 mL Intradermal Once    linaCLOtide  290 mcg Oral Before breakfast    OLANZapine  15 mg Oral QHS      Continuous Infusions:   electrolyte-A   Intravenous Continuous          PRN Meds:    Current Facility-Administered Medications:     acetaminophen, 1,000 mg, Oral, Q6H PRN    aluminum-magnesium hydroxide-simethicone, 30 mL, Oral, QID PRN    bisacodyL, 10 mg, Rectal, Daily PRN    dextrose 50%, 12.5 g, Intravenous, PRN    dextrose 50%, 12.5 g, Intravenous, PRN    dextrose 50%, 25 g, Intravenous, PRN     glucagon (human recombinant), 1 mg, Intramuscular, PRN    glucose, 16 g, Oral, PRN    glucose, 24 g, Oral, PRN    insulin aspart U-100, 0-5 Units, Subcutaneous, Q4H PRN    melatonin, 6 mg, Oral, Nightly PRN    naloxone, 0.02 mg, Intravenous, PRN    ondansetron, 4 mg, Intravenous, Q4H PRN    prochlorperazine, 5 mg, Intravenous, Q6H PRN    senna-docusate, 1 tablet, Oral, BID PRN    sodium chloride 0.9%, 10 mL, Intravenous, PRN    sodium citrate-citric acid 500-334 mg/5 ml, 30 mL, Oral, PRN     Radiology:  I have personally reviewed the following imaging and agree with the radiologist.     FL ERCP Biliary And Pancreatic By Rad Tech  Narrative: EXAMINATION:  FL ERCP BILIARY AND PANCREATIC    CLINICAL HISTORY:  Evaluate Billary Patency;    FINDINGS:  Fluoroscopic assistance provided during ERCP.  Images were independently interpreted by the attending physician performing the procedure.    Fluoro time: 4 minutes 47 seconds.    Reference Air Kerma: 101 mGy.  Impression: Fluoroscopic assistance provided as above.    Electronically signed by: Joon Bone  Date:    04/01/2025  Time:    13:34  MRI MRCP Abdomen WO Contrast 3D WO Independent WS XPD  Narrative: EXAMINATION:  MRI MRCP ABDOMEN WO CONTRAST 3D WO INDEPENDENT WS XPD    CLINICAL HISTORY:  Transaminitis.Biliary obstruction suspected;    TECHNIQUE:  Multiplanar multisequence MRI of the abdomen performed without gadolinium based IV contrast according to MRCP protocol.    COMPARISON:  CT yesterday    FINDINGS:  Liver is not significantly enlarged.  No focal suspicious liver lesion on this noncontrast exam.    There are gallstones.  No significant pericholecystic inflammation.  There is intra and extrahepatic biliary ductal dilatation with the common bile duct measuring up to about 13 mm.  There is a 7 mm filling defect in the distal common bile duct best visualized on image 51 series 9.  Question another filling defect at the ampulla.    No significant abnormality of the  spleen.  The main pancreatic duct is not dilated.  Normal adrenals.  No hydronephrosis.    There is a moderate hiatal hernia.  No dilated bowel loops.  No ascites.  Abdominal aorta normal in caliber.  Impression: 1. Cholelithiasis without convincing MRI evidence of acute cholecystitis.  2. Intra and extrahepatic biliary ductal dilatation with a distal common bile duct filling defect compatible with choledocholithiasis.    Electronically signed by: Joon Bone  Date:    04/01/2025  Time:    09:19      Christophe Goldsmith MD  Department of Hospital Medicine   [facility]   04/02/2025

## 2025-04-02 NOTE — ANESTHESIA POSTPROCEDURE EVALUATION
Anesthesia Post Evaluation    Patient: Martine Nina    Procedure(s) Performed: Procedure(s) (LRB):  XI ROBOTIC CHOLECYSTECTOMY (N/A)    Final Anesthesia Type: general      Patient location during evaluation: PACU  Patient participation: Yes- Able to Participate  Level of consciousness: awake and alert  Post-procedure vital signs: reviewed and stable  Pain management: adequate  Airway patency: patent    PONV status at discharge: No PONV  Anesthetic complications: no      Cardiovascular status: hemodynamically stable  Respiratory status: unassisted  Hydration status: euvolemic  Follow-up not needed.              Vitals Value Taken Time   /91 04/02/25 14:21   Temp 36.7 °C (98 °F) 04/02/25 12:51   Pulse 112 04/02/25 14:21   Resp 16 04/02/25 12:41   SpO2 94 % 04/02/25 14:21   Vitals shown include unfiled device data.      Event Time   Out of Recovery 04/02/2025 12:45:00         Pain/Viola Score: Viola Score: 10 (4/2/2025 12:45 PM)

## 2025-04-02 NOTE — TRANSFER OF CARE
"Anesthesia Transfer of Care Note    Patient: Martine Nina    Procedure(s) Performed: Procedure(s) (LRB):  XI ROBOTIC CHOLECYSTECTOMY (N/A)    Patient location: PACU    Anesthesia Type: general    Transport from OR: Transported from OR on room air with adequate spontaneous ventilation    Post pain: adequate analgesia    Post assessment: no apparent anesthetic complications    Post vital signs: stable    Level of consciousness: sedated    Nausea/Vomiting: no nausea/vomiting    Complications: none    Transfer of care protocol was followed      Last vitals: Visit Vitals  BP (!) 148/86 (BP Location: Left arm, Patient Position: Lying)   Pulse 77   Temp 36.8 °C (98.3 °F) (Oral)   Resp 18   Ht 5' 4" (1.626 m)   Wt 109.1 kg (240 lb 8 oz)   SpO2 97%   Breastfeeding No   BMI 41.28 kg/m²     "

## 2025-04-02 NOTE — [employer]ATION PATIENT INSTRUCTIONS
Discharge Summary/Instructions after an Endoscopic Procedure  Patient Name: Martine Nina  Patient MRN: [id number]  Patient YOB: 1980 Tuesday, April 1, 2025  Ana Rosa Agosto III, MD  Dear patient,  As a result of recent federal legislation (The Federal Cures Act), you may   receive lab or pathology results from your procedure in your [facility]   account before your physician is able to contact you. Your physician or   their representative will relay the results to you with their   recommendations at their soonest availability.  Thank you,  RESTRICTIONS:  During your procedure today, you received medications for sedation.  These   medications may affect your judgment, balance and coordination.  Therefore,   for 24 hours, you have the following restrictions:   - DO NOT drive a car, operate machinery, make legal/financial decisions,   sign important papers or drink alcohol.    ACTIVITY:  Today: no heavy lifting, straining or running due to procedural   sedation/anesthesia.  The following day: return to full activity including work.  DIET:  Eat and drink normally unless instructed otherwise.     TREATMENT FOR COMMON SIDE EFFECTS:  - Mild abdominal pain, nausea, belching, bloating or excessive gas:  rest,   eat lightly and use a heating pad.  - Sore Throat: treat with throat lozenges and/or gargle with warm salt   water.  - Because air was used during the procedure, expelling large amounts of air   from your rectum or belching is normal.  - If a bowel prep was taken, you may not have a bowel movement for 1-3 days.    This is normal.  SYMPTOMS TO WATCH FOR AND REPORT TO YOUR PHYSICIAN:  1. Abdominal pain or bloating, other than gas cramps.  2. Chest pain.  3. Back pain.  4. Signs of infection such as: chills or fever occurring within 24 hours   after the procedure.  5. Rectal bleeding, which would show as bright red, maroon, or black stools.   (A tablespoon of blood from the rectum is not serious, especially  if   hemorrhoids are present.)  6. Vomiting.  7. Weakness or dizziness.  GO DIRECTLY TO THE NEAREST EMERGENCY ROOM IF YOU HAVE ANY OF THE FOLLOWING:      Difficulty breathing              Chills and/or fever over 101 F   Persistent vomiting and/or vomiting blood   Severe abdominal pain   Severe chest pain   Black, tarry stools   Bleeding- more than one tablespoon   Any other symptom or condition that you feel may need urgent attention  Your doctor recommends these additional instructions:  If any biopsies were taken, your doctors clinic will contact you in 1 to 2   weeks with any results.  Recommendations:  - Return patient to [facility] for [facility] care.   - Resume previous diet.   - Trend CMP.  - Start pantoprazole 40 mg twice daily for treatment of erosive esophagitis.     - Hold prophylactic lovenox. Ok to resume on the evening of 4/2.   - Follow-up Surgery recommendations regarding timing of cholecystectomy.   - Watch for pancreatitis, bleeding, perforation, and cholangitis.  Impressions:  - LA Grade D esophagitis with no bleeding likely related to recent vomiting.      - A filling defect consistent with a stone with associated diffuse dilation   of the biliary tree were seen on the cholangiogram.   - Choledocholithiasis was found.  Complete removal was accomplished by   biliary sphincterotomy and balloon extraction.   - Indomethacin given to decrease risk of post-ERCP pancreatitis.  For questions, problems or results please call your physician - Ana Rosa Agosto III, MD at Work:  [phone].  [facility] ED at [phone]  IF A COMPLICATION OR EMERGENCY SITUATION ARISES AND YOU ARE UNABLE TO REACH   YOUR PHYSICIAN - GO DIRECTLY TO THE EMERGENCY ROOM.  Ana Rosa Agosto III, MD  4/2/2025 7:35:47 AM  MD Demarcus Espinoza MD  4/1/2025 1:18:11 PM  [employer]ATION

## 2025-04-02 NOTE — PROGRESS NOTES
GI plan of Care:    No evidence of post RCP pancreatitis today. Tolerating solids without issues.    Plan:  -No further recommendations from G.I.    Please call questions or concerns.

## 2025-04-02 NOTE — ANESTHESIA PROCEDURE NOTES
Intubation    Date/Time: 4/2/2025 7:44 AM    Performed by: Rich Arredondo CRNA  Authorized by: Jose Mart MD    Intubation:     Induction:  Intravenous    Intubated:  Postinduction    Mask Ventilation:  Easy mask    Attempts:  1    Attempted By:  CRNA    Method of Intubation:  Direct    Blade:  Aguilar 2    Laryngeal View Grade: Grade I - full view of cords      Difficult Airway Encountered?: No      Complications:  None    Airway Device:  Oral endotracheal tube    Airway Device Size:  7.0    Style/Cuff Inflation:  Cuffed (inflated to minimal occlusive pressure) (inflated to 45mwA63, verified by manometer)    Inflation Amount (mL):  6    Tube secured:  23    Secured at:  The lips    Placement Verified By:  Capnometry    Complicating Factors:  None    Findings Post-Intubation:  BS equal bilateral and atraumatic/condition of teeth unchanged

## 2025-04-02 NOTE — PROGRESS NOTES
Acute Care Surgery   Progress Note  Admit Date: 3/31/2025  HD#2  POD#* Day of Surgery *    Subjective:   Interval history:  No acute events overnight   Afebrile vital signs stable   T bili downtrending  Liver enzymes downtrending  Denies abdominal pain, nausea, vomiting    Home Meds:  Current Outpatient Medications   Medication Instructions    FLUoxetine 20 mg, Daily    hydroCHLOROthiazide (HYDRODIURIL) 25 mg    OLANZapine (ZYPREXA) 20 mg, Oral, Nightly    pravastatin (PRAVACHOL) 20 mg, Nightly      Scheduled Meds:   FLUoxetine  20 mg Oral Daily    indomethacin  100 mg Rectal Once    LIDOcaine (PF) 10 mg/ml (1%)  1 mL Intradermal Once    linaCLOtide  290 mcg Oral Before breakfast    OLANZapine  15 mg Oral QHS     Continuous Infusions:   electrolyte-A   Intravenous Continuous        electrolyte-A   Intravenous Continuous         PRN Meds:  Current Facility-Administered Medications:     acetaminophen, 1,000 mg, Oral, Q6H PRN    aluminum-magnesium hydroxide-simethicone, 30 mL, Oral, QID PRN    bisacodyL, 10 mg, Rectal, Daily PRN    dextrose 50%, 12.5 g, Intravenous, PRN    dextrose 50%, 12.5 g, Intravenous, PRN    dextrose 50%, 25 g, Intravenous, PRN    glucagon (human recombinant), 1 mg, Intramuscular, PRN    glucose, 16 g, Oral, PRN    glucose, 24 g, Oral, PRN    insulin aspart U-100, 0-5 Units, Subcutaneous, Q4H PRN    melatonin, 6 mg, Oral, Nightly PRN    naloxone, 0.02 mg, Intravenous, PRN    ondansetron, 4 mg, Intravenous, Q4H PRN    prochlorperazine, 5 mg, Intravenous, Q6H PRN    senna-docusate, 1 tablet, Oral, BID PRN    sodium chloride 0.9%, 10 mL, Intravenous, PRN    sodium citrate-citric acid 500-334 mg/5 ml, 30 mL, Oral, PRN     Objective:     VITAL SIGNS: 24 HR MIN & MAX LAST   Temp  Min: 95.5 °F (35.3 °C)  Max: 98.5 °F (36.9 °C)  98.3 °F (36.8 °C)   BP  Min: 114/76  Max: 156/98  (!) 148/86    Pulse  Min: 62  Max: 81  77    Resp  Min: 14  Max: 18  18    SpO2  Min: 94 %  Max: 100 %  97 % (RA)      HT: 5'  "4" (162.6 cm)  WT: 109.1 kg (240 lb 8 oz)  BMI: 41.3     Intake/output:  Intake/Output - Last 3 Shifts         03/31 0700 04/01 0659 04/01 0700 04/02 0659 04/02 0700 04/03 0659    I.V. (mL/kg)  400 (3.7)     IV Piggyback  400     Total Intake(mL/kg)  800 (7.3)     Net  +800            Urine Occurrence 1 x              Intake/Output Summary (Last 24 hours) at 4/2/2025 0809  Last data filed at 4/1/2025 1308  Gross per 24 hour   Intake 800 ml   Output --   Net 800 ml         Lines/drains/airway:       Peripheral IV - Single Lumen 03/31/25 1854 20 G Anterior;Right Forearm (Active)   Site Assessment Clean;Dry;Intact;No swelling;No redness;No warmth;No drainage 04/01/25 0356   Line Securement Device Antimicrobial Adhesive 03/31/25 2115   Extremity Assessment Distal to IV No abnormal discoloration;No redness;No swelling;No warmth 03/31/25 2115   Line Status Capped 04/01/25 0356   Dressing Status Clean;Dry;Intact 04/01/25 0356   Dressing Intervention Integrity maintained 04/01/25 0356   Number of days: 0       Physical examination:  Gen: NAD, AAOx3, answering questions appropriately  HEENT: NC  CV: RR  Resp: NWOB  Abd: S/NT/ND   Ext: moving all extremities spontaneously and purposefully  Neuro: CN II-XII grossly intact    Labs:  Renal:  Recent Labs     03/31/25  1619 04/01/25 0459 04/02/25  0527   BUN 9.0 6.4* 5.8*   CREATININE 0.72 0.64 0.65     No results for input(s): "LACTIC" in the last 72 hours.  FENGI:  Recent Labs     03/31/25  1619 04/01/25 0459 04/02/25  0527    139 141   K 3.5 3.0* 4.1   CL 99 103 108*   CO2 25 25 26   CALCIUM 9.2 9.0 9.0   MG  --  1.80 2.00   ALBUMIN 3.0* 3.1* 3.0*   BILITOT 1.3 1.5 1.1   * 168* 128*   ALKPHOS 655* 640* 589*   * 256* 226*     Heme:  Recent Labs     03/31/25  1619 04/01/25  0459 04/01/25  1445 04/02/25  0527   HGB 11.8* 11.9*  --  12.2   HCT 36.5* 35.2*  --  38.0   * 444*  --  405*   INR  --   --  1.0  --      ID:  Recent Labs     03/31/25  1619 " "04/01/25  0459 04/02/25  0527   WBC 13.90* 14.56* 12.42*     CBG:  Recent Labs     03/31/25  1619 04/01/25 0459 04/02/25  0527   GLUCOSE 102* 115* 88      Cardiovascular:  No results for input(s): "TROPONINI", "CKTOTAL", "CKMB", "BNP" in the last 168 hours.  ABG:  No results for input(s): "PH", "PO2", "PCO2", "HCO3", "BE" in the last 168 hours.   I have reviewed all pertinent lab results within the past 24 hours.    Imaging:  FL ERCP Biliary And Pancreatic By [employer]   Final Result      Fluoroscopic assistance provided as above.         Electronically signed by: Joon Bone   Date:    04/01/2025   Time:    13:34      MRI MRCP Abdomen WO Contrast 3D WO Independent WS XPD   Final Result      1. Cholelithiasis without convincing MRI evidence of acute cholecystitis.   2. Intra and extrahepatic biliary ductal dilatation with a distal common bile duct filling defect compatible with choledocholithiasis.         Electronically signed by: Joon Boen   Date:    04/01/2025   Time:    09:19      US Abdomen Limited   Final Result      Cholelithiasis with positive sonographic Pearl sign, but no significant ascites or gallbladder wall thickening.      Common bile duct not confidently visualized on this exam.      Mild hepatomegaly.         Electronically signed by: Shailesh White   Date:    03/31/2025   Time:    16:57         I have reviewed all pertinent imaging results/findings within the past 24 hours.    Micro/Path/Other:  Microbiology Results (last 7 days)       ** No results found for the last 168 hours. **           Specimens (From admission, onward)      None           Pathology Results  (Last 365 days)      None             Assessment & Plan:   Choledocholithiasis s/p ERCP biliary sphincterotomy and balloon extraction 4/1/25     - NPO  - mIVF  - OR today for robotic rome  - daily labs to trend LFTs  - Rest of care per primary team    Follow up:  [facility] - Address [address] [place]" [address] [address]. Phone - [phone] 1-2 weeks post op in clinic    PCP  GI     Acute Care Surgery Discharge Instructions:   Wound Care Instructions:  Wash incision daily with antibacterial soap and water. Pat dry.   Do not remove steri strips or glue for 5-7 days post op. After post op day 7, ok to remove steri strips once edges of steri strips begin to curl. Do not keep steri strips in place longer than 10 days after surgery.   No lifting (over 10 pounds) or straining for 2 weeks after surgery.  No driving for 3 days after surgery, or as long as taking narcotic pain medication.   No soaking in bath tub for 6 weeks         4/2/2025     The above findings, diagnostics, and treatment plan were discussed with Dr. Russell Avalos who will follow with further assessments and recommendations. Please call with any questions, concerns, or clinical status changes.  This note/OR report was created with the assistance of  voice recognition software or phone  dictation.  There may be transcription errors as a result of using this technology however minimal. Effort has been made to assure accuracy of transcription but any obvious errors or omissions should be clarified with the author of the document.

## 2025-04-02 NOTE — DISCHARGE INSTRUCTIONS
Dr. Russell Avalos's Discharge Instructions   Robotic Cholecystectomy   Wound Care Instructions:  - Keep surgical dressing clean and dry for 48 hours post op, then ok to remove dressing and shower.  - Wash incision daily with antibacterial soap and water. Pat dry.   - Do not remove steri strips for 5-7 days post op. After post op day 7, ok to remove steri strips once edges of steri strips begin to curl. Do not keep steri strips in place longer than 10 days after surgery.     No lifting (over 10 pounds) or straining for 2 weeks after surgery.    No driving for 3 days after surgery, or as long as taking narcotic pain medication.     Follow low fat diet (gallbladder eating plan listed below) for the first 4 weeks after surgery. After 4 weeks, ok to advance to regular diet as tolerated.     Contact General Surgery office with any questions or concerns.  [phone]      Cholelithiasis    Cholelithiasis is a form of gallbladder disease in which gallstones form in the gallbladder. The gallbladder is an organ that stores bile. Bile is made in the liver, and it helps to digest fats. Gallstones begin as small crystals and slowly grow into stones. They may cause no symptoms until the gallbladder tightens (contracts) and a gallstone is blocking the duct (gallbladder attack), which can cause pain. Cholelithiasis is also referred to as gallstones.  There are two main types of gallstones:   Cholesterol stones. These are made of hardened cholesterol and are usually yellow-green in color. They are the most common type of gallstone. Cholesterol is a white, waxy, fat-like substance that is made in the liver.   Pigment stones. These are dark in color and are made of a red-yellow substance that forms when hemoglobin from red blood cells breaks down (bilirubin).  What are the causes?  This condition may be caused by an imbalance in the substances that bile is made of. This can happen if the bile:   Has too much bilirubin.   Has too much  cholesterol.   Does not have enough bile salts. These salts help the body absorb and digest fats.  In some cases, this condition can also be caused by the gallbladder not emptying completely or often enough.  What increases the risk?  The following factors may make you more likely to develop this condition:   Being female.   Having multiple pregnancies. Health care providers sometimes advise removing diseased gallbladders before future pregnancies.   Eating a diet that is heavy in fried foods, fat, and refined carbohydrates, like white bread and white rice.   Being obese.   Being older than age 40.   Prolonged use of medicines that contain female hormones (estrogen).   Having diabetes mellitus.   Rapidly losing weight.   Having a family history of gallstones.   Being of  or [place] descent.   Having an intestinal disease such as Crohn disease.   Having metabolic syndrome.   Having cirrhosis.   Having severe types of anemia such as sickle cell anemia.  What are the signs or symptoms?  In most cases, there are no symptoms. These are known as silent gallstones. If a gallstone blocks the bile ducts, it can cause a gallbladder attack. The main symptom of a gallbladder attack is sudden pain in the upper right abdomen. The pain usually comes at night or after eating a large meal. The pain can last for one or several hours and can spread to the right shoulder or chest.  If the bile duct is blocked for more than a few hours, it can cause infection or inflammation of the gallbladder, liver, or pancreas, which may cause:   Nausea.   Vomiting.   Abdominal pain that lasts for 5 hours or more.   Fever or chills.   Yellowing of the skin or the whites of the eyes (jaundice).   Dark urine.   Light-colored stools.  How is this diagnosed?  This condition may be diagnosed based on:   A physical exam.   Your medical history.   An ultrasound of your gallbladder.   CT scan.   MRI.   Blood tests to check for signs of  infection or inflammation.   A scan of your gallbladder and bile ducts (biliary system) using nonharmful radioactive material and special cameras that can see the radioactive material (cholescintigram). This test checks to see how your gallbladder contracts and whether bile ducts are blocked.   Inserting a small tube with a camera on the end (endoscope) through your mouth to inspect bile ducts and check for blockages (endoscopic retrograde cholangiopancreatogram).  How is this treated?  Treatment for gallstones depends on the severity of the condition. Silent gallstones do not need treatment. If the gallstones cause a gallbladder attack or other symptoms, treatment may be required. Options for treatment include:   Surgery to remove the gallbladder (cholecystectomy). This is the most common treatment.   Medicines to dissolve gallstones. These are most effective at treating small gallstones. You may need to take medicines for up to 6-12 months.   Shock wave treatment (extracorporeal biliary lithotripsy). In this treatment, an ultrasound machine sends shock waves to the gallbladder to break gallstones into smaller pieces. These pieces can then be passed into the intestines or be dissolved by medicine. This is rarely used.   Removing gallstones through endoscopic retrograde cholangiopancreatogram. A small basket can be attached to the endoscope and used to capture and remove gallstones.  Follow these instructions at home:   Take over-the-counter and prescription medicines only as told by your health care provider.   Maintain a healthy weight and follow a healthy diet. This includes:  ? Reducing fatty foods, such as fried food.  ? Reducing refined carbohydrates, like white bread and white rice.  ? Increasing fiber. Aim for foods like almonds, fruit, and beans.   Keep all follow-up visits as told by your health care provider. This is important.  Contact a health care provider if:   You think you have had a gallbladder  attack.   You have been diagnosed with silent gallstones and you develop abdominal pain or indigestion.  Get help right away if:   You have pain from a gallbladder attack that lasts for more than 2 hours.   You have abdominal pain that lasts for more than 5 hours.   You have a fever or chills.   You have persistent nausea and vomiting.   You develop jaundice.   You have dark urine or light-colored stools.  Summary   Cholelithiasis (also called gallstones) is a form of gallbladder disease in which gallstones form in the gallbladder.   This condition is caused by an imbalance in the substances that make up bile. This can happen if the bile has too much cholesterol, too much bilirubin, or not enough bile salts.   You are more likely to develop this condition if you are female, pregnant, using medicines with estrogen, obese, older than age 40, or have a family history of gallstones. You may also develop gallstones if you have diabetes, an intestinal disease, cirrhosis, or metabolic syndrome.   Treatment for gallstones depends on the severity of the condition. Silent gallstones do not need treatment.   If gallstones cause a gallbladder attack or other symptoms, treatment may be needed. The most common treatment is surgery to remove the gallbladder.  This information is not intended to replace advice given to you by your health care provider. Make sure you discuss any questions you have with your health care provider.  Document Released: 12/14/2006 Document Revised: 11/30/2018 Document Reviewed: 09/03/2017  [employer] Patient Education © 2020 [employer]    Laparoscopic Cholecystectomy  Laparoscopic cholecystectomy is surgery to remove the gallbladder. The gallbladder is a pear-shaped organ that lies beneath the liver on the right side of the body. The gallbladder stores bile, which is a fluid that helps the body to digest fats. Cholecystectomy is often done for inflammation of the gallbladder (cholecystitis). This  condition is usually caused by a buildup of gallstones (cholelithiasis) in the gallbladder. Gallstones can block the flow of bile, which can result in inflammation and pain. In severe cases, emergency surgery may be required.  This procedure is done though small incisions in your abdomen (laparoscopic surgery). A thin scope with a camera (laparoscope) is inserted through one incision. Thin surgical instruments are inserted through the other incisions. In some cases, a laparoscopic procedure may be turned into a type of surgery that is done through a larger incision (open surgery).  What happens during the procedure?     To reduce your risk of infection:  ? Your health care team will wash or sanitize their hands.  ? Your skin will be washed with soap.  ? Hair may be removed from the surgical area.   An IV tube may be inserted into one of your veins.   You will be given one or more of the following:  ? A medicine to help you relax (sedative).  ? A medicine to make you fall asleep (general anesthetic).   A breathing tube will be placed in your mouth.   Your surgeon will make several small cuts (incisions) in your abdomen.   The laparoscope will be inserted through one of the small incisions. The camera on the laparoscope will send images to a TV screen (monitor) in the operating room. This lets your surgeon see inside your abdomen.   Air-like gas will be pumped into your abdomen. This will expand your abdomen to give the surgeon more room to perform the surgery.   Other tools that are needed for the procedure will be inserted through the other incisions. The gallbladder will be removed through one of the incisions.   Your common bile duct may be examined. If stones are found in the common bile duct, they may be removed.   After your gallbladder has been removed, the incisions will be closed with stitches (sutures), staples, or skin glue.   Your incisions may be covered with a bandage (dressing).  The procedure may vary  among health care providers and hospitals.  What happens after the procedure?   Your blood pressure, heart rate, breathing rate, and blood oxygen level will be monitored until the medicines you were given have worn off.   You will be given medicines as needed to control your pain.    This information is not intended to replace advice given to you by your health care provider. Make sure you discuss any questions you have with your health care provider.  Document Released: 12/18/2006 Document Revised: 11/30/2018 Document Reviewed: 06/05/2017  [employer] Patient Education © 2020 [employer]    Gallbladder Eating Plan  If you have a gallbladder condition, you may have trouble digesting fats. Eating a low-fat diet can help reduce your symptoms, and may be helpful before and after having surgery to remove your gallbladder (cholecystectomy). Your health care provider may recommend that you work with a diet and nutrition specialist (dietitian) to help you reduce the amount of fat in your diet.  What are tips for following this plan?  General guidelines   Limit your fat intake to less than 30% of your total daily calories. If you eat around 1,800 calories each day, this is less than 60 grams (g) of fat per day.   Fat is an important part of a healthy diet. Eating a low-fat diet can make it hard to maintain a healthy body weight. Ask your dietitian how much fat, calories, and other nutrients you need each day.   Eat small, frequent meals throughout the day instead of three large meals.   Drink at least 8-10 cups of fluid a day. Drink enough fluid to keep your urine clear or pale yellow.   Limit alcohol intake to no more than 1 drink a day for nonpregnant women and 2 drinks a day for men. One drink equals 12 oz of beer, 5 oz of wine, or 1½ oz of hard liquor.  Reading food labels   Check Nutrition Facts on food labels for the amount of fat per serving. Choose foods with less than 3 grams of fat per serving.  Shopping   Choose  nonfat and low-fat healthy foods. Look for the words nonfat, low fat, or fat free.   Avoid buying processed or prepackaged foods.  Cooking   Cook using low-fat methods, such as baking, broiling, grilling, or boiling.   Cook with small amounts of healthy fats, such as olive oil, grapeseed oil, canola oil, or sunflower oil.  What foods are recommended?     All fresh, frozen, or canned fruits and vegetables.   Whole grains.   Low-fat or non-fat (skim) milk and yogurt.   Lean meat, skinless poultry, fish, eggs, and beans.   Low-fat protein supplement powders or drinks.   Spices and herbs.  What foods are not recommended?   High-fat foods. These include baked goods, fast food, fatty cuts of meat, ice cream, french toast, sweet rolls, pizza, cheese bread, foods covered with butter, creamy sauces, or cheese.   Fried foods. These include french fries, tempura, battered fish, breaded chicken, fried breads, and sweets.   Foods with strong odors.   Foods that cause bloating and gas.  Summary   A low-fat diet can be helpful if you have a gallbladder condition, or before and after gallbladder surgery.   Limit your fat intake to less than 30% of your total daily calories. This is about 60 g of fat if you eat 1,800 calories each day.   Eat small, frequent meals throughout the day instead of three large meals.  This information is not intended to replace advice given to you by your health care provider. Make sure you discuss any questions you have with your health care provider.  Document Released: 12/23/2014 Document Revised: 04/09/2020 Document Reviewed: 01/25/2018  [employer] Patient Education © 2020 [employer]    Laparoscopic Cholecystectomy, Care After  This sheet gives you information about how to care for yourself after your procedure. Your health care provider may also give you more specific instructions. If you have problems or questions, contact your health care provider.  What can I expect after the  procedure?  After the procedure, it is common to have:   Pain at your incision sites. You will be given medicines to control this pain.   Mild nausea or vomiting.   Bloating and possible shoulder pain from the air-like gas that was used during the procedure.  Follow these instructions at home:  Incision care     Follow instructions from your health care provider about how to take care of your incisions. Make sure you:  ? Wash your hands with soap and water before you change your bandage (dressing). If soap and water are not available, use hand .  ? Change your dressing as told by your health care provider.  ? Leave stitches (sutures), skin glue, or adhesive strips in place. These skin closures may need to be in place for 2 weeks or longer. If adhesive strip edges start to loosen and curl up, you may trim the loose edges. Do not remove adhesive strips completely unless your health care provider tells you to do that.   Do not take baths, swim, or use a hot tub until your health care provider approves. Ask your health care provider if you can take showers. You may only be allowed to take sponge baths for bathing.   Check your incision area every day for signs of infection. Check for:  ? More redness, swelling, or pain.  ? More fluid or blood.  ? Warmth.  ? Pus or a bad smell.  Activity   Do not drive or use heavy machinery while taking prescription pain medicine.   Do not lift anything that is heavier than 10 lb (4.5 kg) until your health care provider approves.   Do not play contact sports until your health care provider approves.   Rest as needed. Do not return to work or school until your health care provider approves.  General instructions   Take over-the-counter and prescription medicines only as told by your health care provider.   To prevent or treat constipation while you are taking prescription pain medicine, your health care provider may recommend that you:  ? Drink enough fluid to keep your urine  clear or pale yellow.  ? Take over-the-counter or prescription medicines.  ? Eat foods that are high in fiber, such as fresh fruits and vegetables, whole grains, and beans.  ? Limit foods that are high in fat and processed sugars, such as fried and sweet foods.  Contact a health care provider if:   You develop a rash.   You have more redness, swelling, or pain around your incisions.   You have more fluid or blood coming from your incisions.   Your incisions feel warm to the touch.   You have pus or a bad smell coming from your incisions.   You have a fever.   One or more of your incisions breaks open.  Get help right away if:   You have trouble breathing.   You have chest pain.   You have increasing pain in your shoulders.   You faint or feel dizzy when you stand.   You have severe pain in your abdomen.   You have nausea or vomiting that lasts for more than one day.   You have leg pain.    This information is not intended to replace advice given to you by your health care provider. Make sure you discuss any questions you have with your health care provider.  Document Released: 12/18/2006 Document Revised: 11/30/2018 Document Reviewed: 06/05/2017  [employer] Patient Education © 2020 [employer]    [facility] Drain Output Log:      Date: Date: Date: Date: Date: Date: Date: Date: Date: Date: Comments:   Drain #1 AM               PM               Daily total              Drain #2 AM               PM               Daily total              Drain #3 AM               PM               Daily total                Please bring this log to your next [facility] appointment.

## 2025-04-02 NOTE — BRIEF OP NOTE
[facility] - Periop Services  Surgery Department  Operative Note    SUMMARY     Date of Procedure: 4/2/2025     Procedure: Procedure(s) (LRB):  XI ROBOTIC CHOLECYSTECTOMY (N/A)     Surgeons and Role:     * Russell Avalos MD - Primary     * Sid Rubio MD - Resident - Assisting        Pre-Operative Diagnosis: Choledocholithiasis [K80.50]    Post-Operative Diagnosis: Post-Op Diagnosis Codes:     * Choledocholithiasis [K80.50]    Anesthesia: General    Operative Findings (including complications, if any): inflamed gallbladder, large cystic duct with stone. Cystic duct taken with blue stapler, artery clipped. Drain left in gallbladder fossa    Description of Technical Procedures: see op note      Estimated Blood Loss (EBL): * No values recorded between 4/2/2025  7:35 AM and 4/2/2025 11:31 AM *           Implants: * No implants in log *    Specimens:   Specimen (24h ago, onward)       Start     Ordered    04/02/25 1105  Specimen to Pathology  RELEASE UPON ORDERING        References:    Click here for ordering Quick Tip   Question:  Release to patient  Answer:  Immediate    04/02/25 1105                   ID Type Source Tests Collected by Time Destination   A :  Tissue Gallbladder SPECIMEN TO PATHOLOGY Russell Avalos MD 4/2/2025 1105               Condition: Good    Disposition: PACU - hemodynamically stable.    Attestation: Op Note Attestation: The attending physician was present and scrubbed for the entire procedure.    Sid Rubio MD  [facility] General Surgery, PGY-2  04/02/2025

## 2025-04-02 NOTE — ANESTHESIA POSTPROCEDURE EVALUATION
Anesthesia Post Evaluation    Patient: Martine Nina    Procedure(s) Performed: Procedure(s) (LRB):  ERCP (N/A)  ERCP, WITH SPHINCTEROTOMY  ERCP, WITH CALCULUS REMOVAL (N/A)    Final Anesthesia Type: general      Patient location during evaluation: GI PACU  Patient participation: Yes- Able to Participate  Post-procedure mental status: @ basline.  Pain management: adequate  [id number] Mitigation: See pacu RN note for any measures applied.  PONV status: See postop meds for drugs used to control n/v if any.  Anesthetic complications: no      Cardiovascular status: stable  Respiratory status: @ baseline.  Hydration status: euvolemic                Vitals Value Taken Time   /94 04/02/25 04:46   Temp 36.8 °C (98.3 °F) 04/02/25 04:46   Pulse 81 04/02/25 04:46   Resp 18 04/02/25 04:46   SpO2 94 % 04/02/25 04:46         No case tracking events are documented in the log.      Pain/Viola Score: Viola Score: 9 (4/1/2025  1:08 PM)

## 2025-04-02 NOTE — ANESTHESIA PREPROCEDURE EVALUATION
"04/02/2025    Martine Nina is a 44 y.o., female PMH of bipolar, schizophrenia, dyslipidemia, obesity, and choledocholithiasis.  She is s/p  ERCP yesterday under GETA.  To OR today for lap rome.  She is conversant in holding, states that she did well under GETA yesterday.  No cardiopulmonary complaints.    "Assessment/Plan:  Choledocholithiasis vs biliary sludging  Abdominal pain   Schizophrenia/Bipolar disorder     -CT abdomen WWO contrast showed cholelithiasis with 2-3 mm stones  -U/S showed a enlarged liver with cholelithiasis without gallbladder thickening  -MRCP shows cholelithiasis without cholecystitis.  Also noted intra and extrahepatic biliary duct dilation with CBD filling defect  -ERCP performed today  - GI and surgery following  -surgery plans for cholecystectomy tomorrow  - repeat labs in AM   - home psych resumed"      Pre-op Assessment    I have reviewed the Patient Summary Reports.     I have reviewed the Nursing Notes. I have reviewed the NPO Status.   I have reviewed the Medications.     Review of Systems  Anesthesia Hx:  No problems with previous Anesthesia                Cardiovascular:  Exercise tolerance: good          Denies Angina.                                    Pulmonary:       Denies Shortness of breath.                  Endocrine:        Morbid Obesity / BMI > 40  Psych:  Psychiatric History                  Past Medical History:   Diagnosis Date    Bipolar disorder, unspecified     Depression     Hypercholesteremia     Schizophrenia, unspecified        Problem List[1]    Review of patient's allergies indicates:   Allergen Reactions    Hydrocodone-acetaminophen        Current Outpatient Medications   Medication Instructions    FLUoxetine 20 mg, Daily    hydroCHLOROthiazide (HYDRODIURIL) 25 mg    OLANZapine (ZYPREXA) 20 mg, Oral, Nightly    pravastatin (PRAVACHOL) 20 mg, Nightly           Physical Exam  General: Well nourished and Cooperative    Airway:  Mallampati: II   Mouth " Opening: Normal  TM Distance: Normal  Tongue: Normal  Neck ROM: Normal ROM  Neck: Girth Increased    Dental:  Edentulous    Chest/Lungs:  Normal Respiratory Rate    Heart:  Rhythm: Regular Rhythm            Anesthesia Plan  Type of Anesthesia, risks & benefits discussed:    Anesthesia Type: Gen ETT  Intra-op Monitoring Plan: Standard ASA Monitors  Post Op Pain Control Plan: multimodal analgesia  Induction:  IV  Informed Consent: Informed consent signed with the Patient and all parties understand the risks and agree with anesthesia plan.  All questions answered.   ASA Score: 2  Day of Surgery Review of History & Physical: H&P Update referred to the surgeon/provider.    Ready For Surgery From Anesthesia Perspective.     .  Anesthesia consent includes material facts, risks, complications & alternatives, and possibility of altering the anesthesia plan due to intraoperative conditions.    I reviewed problem list, prior to admission medication list, appropriate labs, any workup, Xray, EKG etc   See anesthesia chart for details of the anesthesia plan carried out.       Jose Mart MD    ¤                [1] There is no problem list on file for this patient.

## 2025-04-03 LAB
ALBUMIN SERPL-MCNC: 2.9 G/DL (ref 3.5–5)
ALBUMIN/GLOB SERPL: 0.7 RATIO (ref 1.1–2)
ALP SERPL-CCNC: 487 UNIT/L (ref 40–150)
ALT SERPL-CCNC: 180 UNIT/L (ref 0–55)
ANION GAP SERPL CALC-SCNC: 13 MEQ/L
AST SERPL-CCNC: 106 UNIT/L (ref 11–45)
BACTERIA #/AREA URNS AUTO: ABNORMAL /HPF
BASOPHILS # BLD AUTO: 0.08 X10(3)/MCL
BASOPHILS NFR BLD AUTO: 0.4 %
BILIRUB SERPL-MCNC: 1 MG/DL
BILIRUB UR QL STRIP.AUTO: NEGATIVE
BUN SERPL-MCNC: 7.8 MG/DL (ref 7–18.7)
CALCIUM SERPL-MCNC: 9 MG/DL (ref 8.4–10.2)
CAOX CRY UR QL COMP ASSIST: ABNORMAL
CHLORIDE SERPL-SCNC: 105 MMOL/L (ref 98–107)
CLARITY UR: ABNORMAL
CO2 SERPL-SCNC: 26 MMOL/L (ref 22–29)
COLOR UR AUTO: YELLOW
CREAT SERPL-MCNC: 0.72 MG/DL (ref 0.55–1.02)
CREAT/UREA NIT SERPL: 11
EOSINOPHIL # BLD AUTO: 0.06 X10(3)/MCL (ref 0–0.9)
EOSINOPHIL NFR BLD AUTO: 0.3 %
ERYTHROCYTE [DISTWIDTH] IN BLOOD BY AUTOMATED COUNT: 22.4 % (ref 11.5–17)
GFR SERPLBLD CREATININE-BSD FMLA CKD-EPI: >60 ML/MIN/1.73/M2
GLOBULIN SER-MCNC: 3.9 GM/DL (ref 2.4–3.5)
GLUCOSE SERPL-MCNC: 121 MG/DL (ref 74–100)
GLUCOSE UR QL STRIP: NORMAL
HCT VFR BLD AUTO: 36.6 % (ref 37–47)
HGB BLD-MCNC: 11.9 G/DL (ref 12–16)
HGB UR QL STRIP: ABNORMAL
IMM GRANULOCYTES # BLD AUTO: 0.1 X10(3)/MCL (ref 0–0.04)
IMM GRANULOCYTES NFR BLD AUTO: 0.5 %
KETONES UR QL STRIP: NEGATIVE
LEUKOCYTE ESTERASE UR QL STRIP: 25
LYMPHOCYTES # BLD AUTO: 4 X10(3)/MCL (ref 0.6–4.6)
LYMPHOCYTES NFR BLD AUTO: 20.9 %
MCH RBC QN AUTO: 26.9 PG (ref 27–31)
MCHC RBC AUTO-ENTMCNC: 32.5 G/DL (ref 33–36)
MCV RBC AUTO: 82.8 FL (ref 80–94)
MONOCYTES # BLD AUTO: 1.62 X10(3)/MCL (ref 0.1–1.3)
MONOCYTES NFR BLD AUTO: 8.5 %
MUCOUS THREADS URNS QL MICRO: ABNORMAL /LPF
NEUTROPHILS # BLD AUTO: 13.26 X10(3)/MCL (ref 2.1–9.2)
NEUTROPHILS NFR BLD AUTO: 69.4 %
NITRITE UR QL STRIP: NEGATIVE
NRBC BLD AUTO-RTO: 0 %
PH UR STRIP: 6.5 [PH]
PLATELET # BLD AUTO: 424 X10(3)/MCL (ref 130–400)
PLATELET # BLD EST: ABNORMAL 10*3/UL
PMV BLD AUTO: 10.8 FL (ref 7.4–10.4)
POIKILOCYTOSIS BLD QL SMEAR: ABNORMAL
POTASSIUM SERPL-SCNC: 3.9 MMOL/L (ref 3.5–5.1)
PROT SERPL-MCNC: 6.8 GM/DL (ref 6.4–8.3)
PROT UR QL STRIP: ABNORMAL
PSYCHE PATHOLOGY RESULT: NORMAL
RBC # BLD AUTO: 4.42 X10(6)/MCL (ref 4.2–5.4)
RBC #/AREA URNS AUTO: ABNORMAL /HPF
RBC MORPH BLD: ABNORMAL
SODIUM SERPL-SCNC: 144 MMOL/L (ref 136–145)
SP GR UR STRIP.AUTO: 1.03 (ref 1–1.03)
SQUAMOUS #/AREA URNS LPF: ABNORMAL /HPF
TARGETS BLD QL SMEAR: ABNORMAL
UROBILINOGEN UR STRIP-ACNC: 2
WBC # BLD AUTO: 19.12 X10(3)/MCL (ref 4.5–11.5)
WBC #/AREA URNS AUTO: ABNORMAL /HPF

## 2025-04-03 PROCEDURE — 63600175 PHARM REV CODE 636 W HCPCS: Performed by: INTERNAL MEDICINE

## 2025-04-03 PROCEDURE — 81001 URINALYSIS AUTO W/SCOPE: CPT | Performed by: INTERNAL MEDICINE

## 2025-04-03 PROCEDURE — 85025 COMPLETE CBC W/AUTO DIFF WBC: CPT | Performed by: STUDENT IN AN ORGANIZED HEALTH CARE EDUCATION/TRAINING PROGRAM

## 2025-04-03 PROCEDURE — 87040 BLOOD CULTURE FOR BACTERIA: CPT | Performed by: INTERNAL MEDICINE

## 2025-04-03 PROCEDURE — 25000003 PHARM REV CODE 250

## 2025-04-03 PROCEDURE — 25000003 PHARM REV CODE 250: Performed by: NURSE PRACTITIONER

## 2025-04-03 PROCEDURE — 99024 POSTOP FOLLOW-UP VISIT: CPT | Mod: GC,ICN,, | Performed by: STUDENT IN AN ORGANIZED HEALTH CARE EDUCATION/TRAINING PROGRAM

## 2025-04-03 PROCEDURE — 36415 COLL VENOUS BLD VENIPUNCTURE: CPT | Performed by: INTERNAL MEDICINE

## 2025-04-03 PROCEDURE — 76937 US GUIDE VASCULAR ACCESS: CPT

## 2025-04-03 PROCEDURE — C1751 CATH, INF, PER/CENT/MIDLINE: HCPCS

## 2025-04-03 PROCEDURE — 36410 VNPNXR 3YR/> PHY/QHP DX/THER: CPT

## 2025-04-03 PROCEDURE — 25000003 PHARM REV CODE 250: Performed by: INTERNAL MEDICINE

## 2025-04-03 PROCEDURE — 36415 COLL VENOUS BLD VENIPUNCTURE: CPT | Performed by: STUDENT IN AN ORGANIZED HEALTH CARE EDUCATION/TRAINING PROGRAM

## 2025-04-03 PROCEDURE — 11000001 HC ACUTE MED/SURG PRIVATE ROOM

## 2025-04-03 PROCEDURE — 80053 COMPREHEN METABOLIC PANEL: CPT

## 2025-04-03 PROCEDURE — 25000003 PHARM REV CODE 250: Performed by: PHYSICIAN ASSISTANT

## 2025-04-03 RX ORDER — SODIUM CHLORIDE 0.9 % (FLUSH) 0.9 %
10 SYRINGE (ML) INJECTION EVERY 12 HOURS PRN
Status: DISCONTINUED | OUTPATIENT
Start: 2025-04-03 | End: 2025-04-04 | Stop reason: HOSPADM

## 2025-04-03 RX ADMIN — PANTOPRAZOLE SODIUM 40 MG: 40 TABLET, DELAYED RELEASE ORAL at 05:04

## 2025-04-03 RX ADMIN — OLANZAPINE 15 MG: 5 TABLET, FILM COATED ORAL at 09:04

## 2025-04-03 RX ADMIN — OXYCODONE HYDROCHLORIDE 5 MG: 5 TABLET ORAL at 08:04

## 2025-04-03 RX ADMIN — PIPERACILLIN SODIUM AND TAZOBACTAM SODIUM 4.5 G: 4; .5 INJECTION, POWDER, LYOPHILIZED, FOR SOLUTION INTRAVENOUS at 05:04

## 2025-04-03 RX ADMIN — LINACLOTIDE 290 MCG: 145 CAPSULE, GELATIN COATED ORAL at 05:04

## 2025-04-03 RX ADMIN — FLUOXETINE HYDROCHLORIDE 20 MG: 20 CAPSULE ORAL at 08:04

## 2025-04-03 NOTE — PROGRESS NOTES
"   Acute Care Surgery   Progress Note  Admit Date: 3/31/2025  HD#3  POD#1 Day Post-Op    Subjective:   Interval history:  Af, vss  Tolerating diet well  Pain well controlled  Ambulating to restroom.    BRENDAN 85 ss      Home Meds:  Current Outpatient Medications   Medication Instructions    FLUoxetine 20 mg, Daily    hydroCHLOROthiazide (HYDRODIURIL) 25 mg    OLANZapine (ZYPREXA) 20 mg, Oral, Nightly    pravastatin (PRAVACHOL) 20 mg, Nightly      Scheduled Meds:   FLUoxetine  20 mg Oral Daily    linaCLOtide  290 mcg Oral Before breakfast    OLANZapine  15 mg Oral QHS    pantoprazole  40 mg Oral BID AC     Continuous Infusions:   electrolyte-A   Intravenous Continuous        electrolyte-A   Intravenous Continuous        lactated ringers   Intravenous Continuous 50 mL/hr at 04/02/25 1442 New Bag at 04/02/25 1442     PRN Meds:  Current Facility-Administered Medications:     acetaminophen, 1,000 mg, Oral, Q6H PRN    aluminum-magnesium hydroxide-simethicone, 30 mL, Oral, QID PRN    bisacodyL, 10 mg, Rectal, Daily PRN    dextrose 50%, 12.5 g, Intravenous, PRN    dextrose 50%, 12.5 g, Intravenous, PRN    dextrose 50%, 25 g, Intravenous, PRN    glucagon (human recombinant), 1 mg, Intramuscular, PRN    glucose, 16 g, Oral, PRN    glucose, 24 g, Oral, PRN    melatonin, 6 mg, Oral, Nightly PRN    naloxone, 0.02 mg, Intravenous, PRN    ondansetron, 4 mg, Intravenous, Q4H PRN    oxyCODONE, 5 mg, Oral, Q6H PRN    oxyCODONE, 10 mg, Oral, Q6H PRN    prochlorperazine, 5 mg, Intravenous, Q6H PRN    senna-docusate, 1 tablet, Oral, BID PRN    sodium chloride 0.9%, 10 mL, Intravenous, PRN     Objective:     VITAL SIGNS: 24 HR MIN & MAX LAST   Temp  Min: 97.4 °F (36.3 °C)  Max: 98.6 °F (37 °C)  97.4 °F (36.3 °C)   BP  Min: 111/74  Max: 166/88  121/84    Pulse  Min: 66  Max: 112  98    Resp  Min: 12  Max: 19  17    SpO2  Min: 85 %  Max: 100 %  98 %      HT: 5' 4" (162.6 cm)  WT: 109.1 kg (240 lb 8 oz)  BMI: 41.3 " "    Intake/output:  Intake/Output - Last 3 Shifts         04/01 0700  04/02 0659 04/02 0700 04/03 0659 04/03 0700 04/04 0659    I.V. (mL/kg) 400 (3.7)      IV Piggyback 400 1600     Total Intake(mL/kg) 800 (7.3) 1600 (14.7)     Urine (mL/kg/hr)  300 (0.1)     Drains  85     Total Output  385     Net +800 +1215            Urine Occurrence  1 x             Intake/Output Summary (Last 24 hours) at 4/3/2025 0726  Last data filed at 4/3/2025 0420  Gross per 24 hour   Intake 1600 ml   Output 385 ml   Net 1215 ml           Lines/drains/airway:       Peripheral IV - Single Lumen 04/02/25 0715 20 G No Right Antecubital (Active)   Site Assessment Clean;Dry;Intact;No redness;No swelling;No drainage;No warmth 04/03/25 0400   Line Securement Device Secured with sutureless device 04/02/25 2000   Extremity Assessment Distal to IV No abnormal discoloration;No redness;No swelling;No warmth 04/02/25 2000   Line Status Saline locked 04/03/25 0400   Dressing Status Clean;Dry;Intact 04/03/25 0400   Dressing Intervention Integrity maintained 04/03/25 0000   Number of days: 1            Closed/Suction Drain 04/02/25 1120 RLQ Bulb 19 Fr. (Active)   Site Description Unable to view 04/02/25 1215   Dressing Type Gauze 04/02/25 2000   Dressing Status Clean;Dry;Intact 04/02/25 2000   Dressing Intervention First dressing 04/02/25 1215   Drainage Serosanguineous 04/02/25 2000   Status To bulb suction 04/02/25 2000   Output (mL) 50 mL 04/03/25 0420   Number of days: 0       Physical examination:  Gen: NAD, AAOx3, answering questions appropriately  HEENT: MADHAVI  CV: RR  Resp: NWOB  Abd: S, NT, ND. Incisions c/d/I. Yossi in place  Ext: moving all extremities spontaneously and purposefully  Neuro: CN II-XII grossly intact    Labs:  Renal:  Recent Labs     03/31/25  1619 04/01/25  0459 04/02/25  0527 04/03/25  0608   BUN 9.0 6.4* 5.8* 7.8   CREATININE 0.72 0.64 0.65 0.72     No results for input(s): "LACTIC" in the last 72 hours.  CRISTOBAL:  Recent Labs " "    03/31/25 1619 04/01/25 0459 04/02/25 0527 04/03/25  0608    139 141 144   K 3.5 3.0* 4.1 3.9   CL 99 103 108* 105   CO2 25 25 26 26   CALCIUM 9.2 9.0 9.0 9.0   MG  --  1.80 2.00  --    ALBUMIN 3.0* 3.1* 3.0* 2.9*   BILITOT 1.3 1.5 1.1 1.0   * 168* 128* 106*   ALKPHOS 655* 640* 589* 487*   * 256* 226* 180*     Heme:  Recent Labs     03/31/25 1619 04/01/25 0459 04/01/25 1445 04/02/25 0527 04/03/25  0608   HGB 11.8* 11.9*  --  12.2 11.9*   HCT 36.5* 35.2*  --  38.0 36.6*   * 444*  --  405* 424*   INR  --   --  1.0  --   --      ID:  Recent Labs     03/31/25 1619 04/01/25 0459 04/02/25  0527 04/03/25  0608   WBC 13.90* 14.56* 12.42* 19.12*     CBG:  Recent Labs     03/31/25 1619 04/01/25 0459 04/02/25 0527 04/03/25  0608   GLUCOSE 102* 115* 88 121*      Cardiovascular:  No results for input(s): "TROPONINI", "CKTOTAL", "CKMB", "BNP" in the last 168 hours.  ABG:  No results for input(s): "PH", "PO2", "PCO2", "HCO3", "BE" in the last 168 hours.   I have reviewed all pertinent lab results within the past 24 hours.    Imaging:  FL ERCP Biliary And Pancreatic By [employer] Tech   Final Result      Fluoroscopic assistance provided as above.         Electronically signed by: Joon Bone   Date:    04/01/2025   Time:    13:34      MRI MRCP Abdomen WO Contrast 3D WO Independent WS XPD   Final Result      1. Cholelithiasis without convincing MRI evidence of acute cholecystitis.   2. Intra and extrahepatic biliary ductal dilatation with a distal common bile duct filling defect compatible with choledocholithiasis.         Electronically signed by: Joon Bone   Date:    04/01/2025   Time:    09:19      US Abdomen Limited   Final Result      Cholelithiasis with positive sonographic Pearl sign, but no significant ascites or gallbladder wall thickening.      Common bile duct not confidently visualized on this exam.      Mild hepatomegaly.         Electronically signed by: Shailesh White "   Date:    03/31/2025   Time:    16:57         I have reviewed all pertinent imaging results/findings within the past 24 hours.    Micro/Path/Other:  Microbiology Results (last 7 days)       ** No results found for the last 168 hours. **           Pathology Results  (Last 7 days)      None             Assessment & Plan:   Martine Nina is a 44 y.o. female who presents with choledocholithiasis s/p ERCP, sphincterotomy, balloon extraction 4/1, now s/p lap rome 4/2 w/ BRENDAN placement    [facility]  -regular diet  -encourage ambulation  -drain teaching before discharge  -OK for discharge    Follow up:  [facility] - Address [address] [place] [address] [address]. Phone - [phone] 1 week post op in clinic    PCP  GI      Acute Care Surgery Discharge Instructions:   Wound Care Instructions:  Wash incision daily with antibacterial soap and water. Pat dry.   Do not remove steri strips or glue for 5-7 days post op. After post op day 7, ok to remove steri strips once edges of steri strips begin to curl. Do not keep steri strips in place longer than 10 days after surgery.   No lifting (over 10 pounds) or straining for 2 weeks after surgery.  No driving for 3 days after surgery, or as long as taking narcotic pain medication.   No soaking in bath tub for 6 weeks     Sid Rubio MD  [facility] General Surgery, PGY-2  04/03/2025

## 2025-04-03 NOTE — PROGRESS NOTES
"[facility] Hospital Medicine Progress Note        Chief Complaint: Inpatient Follow-up     HPI:   44 yr old female whose history includes Bipolar disorder, schizophrenia and dyslipidemia. At the time of md's exam patient is AAO x 3. Appears comfortable. Her personal home sitter at bedside. Reported having abdominal pain 2 weeks ago which lasted about a week prior to resolving but returned again . Outpatient labs and CT abdomen/pelvis ordered by PCP which revealed a significant transaminitis and stones in CBD. She was instructed to present to ED for further evaluation. Endorses vomiting after eating a "heavy" meal and a 17 pound weight loss since 3/6/25.     CT abdomen and pelvis with and without contrast showed mild hepatomegaly with dilated intrahepatic and extrahepatic biliary ducts with suspected 2-3 mm stones of the distal common bile duct/pancreatic head, cholelithiasis, moderate-size hiatal hernia with mucosal thickening and findings of constipation with dense feces at the left-sided the colon including a dense fecal bolus at the rectum measuring up to 7 cm in diameter.     VS on arrival: T 98.2, P 74, R 20, B/P 140/93, sats 99% on room air. Initial labs include WBC 13.9 (appears chronic), , , .  Abdominal ultrasound shows cholelithiasis with a positive sonographic Pearl's sign but no significant ascites or gallbladder wall thickening.  Common bile duct not confidently visualized on this exam.  Mild hepatomegaly.MRCP shows cholelithiasis without cholecystitis.  Also noted intra and extrahepatic biliary duct dilation with CBD filling defect    Gastroenterology and General surgery on board.  Status post ERCP.sphincterotomy, balloon extraction 4/1    Interval Hx:   Now status post lap rome 4/2 w/ BRENDAN placement.    Patient was seen and examined, denies any fevers or chills, abdominal pain or nausea/vomiting, cough/shortness a breath, UTI symptoms.  Chart was " reviewed, afebrile.  Labs reviewed.  Noted leukocytosis-19 k, transaminitis    Objective/physical exam:  General: In no acute distress, afebrile, obese  Chest: Clear to auscultation bilaterally  Heart:  +S1, S2, normal rate  Abdomen: Soft, nontender, BS +  MSK: Warm, no lower extremity edema, no clubbing or cyanosis  Neurologic: Alert and oriented     VITAL SIGNS: 24 HRS MIN & MAX LAST   Temp  Min: 97.4 °F (36.3 °C)  Max: 98.6 °F (37 °C) 97.9 °F (36.6 °C)   BP  Min: 111/74  Max: 139/86 126/82   Pulse  Min: 82  Max: 103  92   Resp  Min: 17  Max: 18 18   SpO2  Min: 93 %  Max: 99 % 98 %     I have reviewed the following labs:  Recent Labs   Lab 04/01/25 0459 04/02/25  0527 04/03/25  0608   WBC 14.56* 12.42* 19.12*   RBC 4.49 4.69 4.42   HGB 11.9* 12.2 11.9*   HCT 35.2* 38.0 36.6*   MCV 78.4* 81.0 82.8   MCH 26.5* 26.0* 26.9*   MCHC 33.8 32.1* 32.5*   RDW 22.0* 21.9* 22.4*   * 405* 424*   MPV 10.9* 11.1* 10.8*     Recent Labs   Lab 04/01/25  0459 04/02/25  0527 04/03/25  0608    141 144   K 3.0* 4.1 3.9    108* 105   CO2 25 26 26   BUN 6.4* 5.8* 7.8   CREATININE 0.64 0.65 0.72   CALCIUM 9.0 9.0 9.0   MG 1.80 2.00  --    ALBUMIN 3.1* 3.0* 2.9*   ALKPHOS 640* 589* 487*   * 226* 180*   * 128* 106*   BILITOT 1.5 1.1 1.0     Microbiology Results (last 7 days)       Procedure Component Value Units Date/Time    Blood Culture [[id number]] Collected: 04/03/25 1223    Order Status: Resulted Specimen: Blood from Arm, Right Updated: 04/03/25 1245    Blood Culture [[id number]] Collected: 04/03/25 1223    Order Status: Resulted Specimen: Blood from Arm, Left Updated: 04/03/25 1244             See below for Radiology    Assessment/Plan:  Abdominal pain   Choledocholithiasis  s/p ERCP, sphincterotomy, balloon extraction 4/1, now s/p lap rome 4/2 w/ BRENDAN placement  Schizophrenia/Bipolar disorder       Plan   Gastroenterology followed, status post ERCP  General surgery followed, status post lap  rome  Noted leukocytosis, start Zosyn, follow up on blood cultures, UA, can switch to Augmentin tomorrow blood cultures negative and discharged home.  Continue supportive care, analgesics p.r.n., antiemetics p.r.n.,  regular diet  Continue other appropriate home medications  Updated caretaker    VTE prophylaxis:  SCDs      Anticipated discharge and Disposition:   Home at discharge      All diagnosis and differential diagnosis have been reviewed; assessment and plan has been documented; I have personally reviewed the labs and test results that are presently available; I have reviewed the patients medication list; I have reviewed the consulting providers response and recommendations. I have reviewed or attempted to review medical records based upon their availability    All of the patient's questions have been  addressed and answered. Patient's is agreeable to the above stated plan. I will continue to monitor closely and make adjustments to medical management as needed.    Portions of this note dictated using EMR integrated voice recognition software, and may be subject to voice recognition errors not corrected at proofreading. Please contact writer for clarification if needed.   _____________________________________________________________________    Malnutrition Status:  Nutrition consulted. Most recent weight and BMI monitored-     Measurements:  Wt Readings from Last 1 Encounters:   04/01/25 109.1 kg (240 lb 8 oz)   Body mass index is 41.28 kg/m².    Patient has been screened and assessed by RD.    Malnutrition Type:  Context:    Level:      Malnutrition Characteristic Summary:       Interventions/Recommendations (treatment strategy):        Scheduled Med:   FLUoxetine  20 mg Oral Daily    linaCLOtide  290 mcg Oral Before breakfast    OLANZapine  15 mg Oral QHS    pantoprazole  40 mg Oral BID AC    piperacillin-tazobactam (Zosyn) IV (PEDS and ADULTS) (extended infusion is not appropriate)  4.5 g Intravenous Q8H       Continuous Infusions:   electrolyte-A   Intravenous Continuous        electrolyte-A   Intravenous Continuous        lactated ringers   Intravenous Continuous 50 mL/hr at 04/02/25 1442 New Bag at 04/02/25 1442      PRN Meds:    Current Facility-Administered Medications:     acetaminophen, 1,000 mg, Oral, Q6H PRN    aluminum-magnesium hydroxide-simethicone, 30 mL, Oral, QID PRN    bisacodyL, 10 mg, Rectal, Daily PRN    dextrose 50%, 12.5 g, Intravenous, PRN    dextrose 50%, 12.5 g, Intravenous, PRN    dextrose 50%, 25 g, Intravenous, PRN    glucagon (human recombinant), 1 mg, Intramuscular, PRN    glucose, 16 g, Oral, PRN    glucose, 24 g, Oral, PRN    melatonin, 6 mg, Oral, Nightly PRN    naloxone, 0.02 mg, Intravenous, PRN    ondansetron, 4 mg, Intravenous, Q4H PRN    oxyCODONE, 5 mg, Oral, Q6H PRN    oxyCODONE, 10 mg, Oral, Q6H PRN    prochlorperazine, 5 mg, Intravenous, Q6H PRN    senna-docusate, 1 tablet, Oral, BID PRN    sodium chloride 0.9%, 10 mL, Intravenous, PRN     Radiology:  I have personally reviewed the following imaging and agree with the radiologist.     FL ERCP Biliary And Pancreatic By Rad Tech  Narrative: EXAMINATION:  FL ERCP BILIARY AND PANCREATIC    CLINICAL HISTORY:  Evaluate Billary Patency;    FINDINGS:  Fluoroscopic assistance provided during ERCP.  Images were independently interpreted by the attending physician performing the procedure.    Fluoro time: 4 minutes 47 seconds.    Reference Air Kerma: 101 mGy.  Impression: Fluoroscopic assistance provided as above.    Electronically signed by: Joon Bone  Date:    04/01/2025  Time:    13:34  MRI MRCP Abdomen WO Contrast 3D WO Independent WS XPD  Narrative: EXAMINATION:  MRI MRCP ABDOMEN WO CONTRAST 3D WO INDEPENDENT WS XPD    CLINICAL HISTORY:  Transaminitis.Biliary obstruction suspected;    TECHNIQUE:  Multiplanar multisequence MRI of the abdomen performed without gadolinium based IV contrast according to MRCP  protocol.    COMPARISON:  CT yesterday    FINDINGS:  Liver is not significantly enlarged.  No focal suspicious liver lesion on this noncontrast exam.    There are gallstones.  No significant pericholecystic inflammation.  There is intra and extrahepatic biliary ductal dilatation with the common bile duct measuring up to about 13 mm.  There is a 7 mm filling defect in the distal common bile duct best visualized on image 51 series 9.  Question another filling defect at the ampulla.    No significant abnormality of the spleen.  The main pancreatic duct is not dilated.  Normal adrenals.  No hydronephrosis.    There is a moderate hiatal hernia.  No dilated bowel loops.  No ascites.  Abdominal aorta normal in caliber.  Impression: 1. Cholelithiasis without convincing MRI evidence of acute cholecystitis.  2. Intra and extrahepatic biliary ductal dilatation with a distal common bile duct filling defect compatible with choledocholithiasis.    Electronically signed by: Joon Bone  Date:    04/01/2025  Time:    09:19      Ashanti Bonilla MD  Department of Hospital Medicine   [facility]   04/03/2025

## 2025-04-03 NOTE — PROGRESS NOTES
Inpatient Nutrition Evaluation    Admit Date: 3/31/2025   Total duration of encounter: 3 days    Nutrition Recommendation/Prescription     Diet Adult Regular Standard Tray ordered  Encouraged adequate PO intake  Monitor appetite/PO intake, weight, and labs    Nutrition Assessment     Chart Review    Reason Seen: malnutrition screening tool (MST)    Malnutrition Screening Tool Results   Have you recently lost weight without trying?: Yes: 14-23 lbs  Have you been eating poorly because of a decreased appetite?: No   MST Score: 2     Diagnosis:  Choledocholithiasis vs biliary sludging  Abdominal pain   Schizophrenia/Bipolar disorder    Relevant Medical History:   Bipolar disorder  Schizophrenia  Depression  Dyslipidemia  Obesity     Nutrition-Related Medications:   Scheduled Meds:   FLUoxetine  20 mg Oral Daily    linaCLOtide  290 mcg Oral Before breakfast    OLANZapine  15 mg Oral QHS    pantoprazole  40 mg Oral BID AC    piperacillin-tazobactam (Zosyn) IV (PEDS and ADULTS) (extended infusion is not appropriate)  4.5 g Intravenous Q8H     Continuous Infusions:   electrolyte-A   Intravenous Continuous        electrolyte-A   Intravenous Continuous        lactated ringers   Intravenous Continuous 50 mL/hr at 04/02/25 1442 New Bag at 04/02/25 1442     PRN Meds:.  Current Facility-Administered Medications:     acetaminophen, 1,000 mg, Oral, Q6H PRN    aluminum-magnesium hydroxide-simethicone, 30 mL, Oral, QID PRN    bisacodyL, 10 mg, Rectal, Daily PRN    dextrose 50%, 12.5 g, Intravenous, PRN    dextrose 50%, 12.5 g, Intravenous, PRN    dextrose 50%, 25 g, Intravenous, PRN    glucagon (human recombinant), 1 mg, Intramuscular, PRN    glucose, 16 g, Oral, PRN    glucose, 24 g, Oral, PRN    melatonin, 6 mg, Oral, Nightly PRN    naloxone, 0.02 mg, Intravenous, PRN    ondansetron, 4 mg, Intravenous, Q4H PRN    oxyCODONE, 5 mg, Oral, Q6H PRN    oxyCODONE, 10 mg, Oral, Q6H PRN    prochlorperazine, 5 mg, Intravenous, Q6H PRN     "senna-docusate, 1 tablet, Oral, BID PRN    sodium chloride 0.9%, 10 mL, Intravenous, PRN      Nutrition-Related Labs:  Recent Labs   Lab 25  1619 25  0459 25  0527 25  0608    139 141 144   K 3.5 3.0* 4.1 3.9   CALCIUM 9.2 9.0 9.0 9.0   MG  --  1.80 2.00  --    CL 99 103 108* 105   CO2 25 25 26 26   BUN 9.0 6.4* 5.8* 7.8   CREATININE 0.72 0.64 0.65 0.72   EGFRNORACEVR >60 >60 >60 >60   GLUCOSE 102* 115* 88 121*   BILITOT 1.3 1.5 1.1 1.0   ALKPHOS 655* 640* 589* 487*   * 256* 226* 180*   * 168* 128* 106*   ALBUMIN 3.0* 3.1* 3.0* 2.9*   LIPASE  --  19  --   --    WBC 13.90* 14.56* 12.42* 19.12*   HGB 11.8* 11.9* 12.2 11.9*   HCT 36.5* 35.2* 38.0 36.6*         Diet Order: Diet Adult Regular Standard Tray  Oral Supplement Order: none  Appetite/Oral Intake: good/% of meals  Factors Affecting Nutritional Intake: none identified  Food/Sikh/Cultural Preferences: none reported  Food Allergies: none reported       Wound(s):   none noted    Comments    4/3/2025: No significant fat/muscle wasting per NFPE. Pt reports a good appetite/PO intake prior to admit and currently. Encouraged adequate PO intake. Pt denies N/V/D/C and chewing/swallowing difficulties. Pt wears dentures but does not have access to them, denies need for texture modification at this time. Pt reports UBW as 113.1 kg, denies unplanned wt loss. Last BM documented as 4/3/2025. Will monitor.    Anthropometrics    Height: 5' 4" (162.6 cm) Height Method: Stated  Last Weight: 109.1 kg (240 lb 8 oz) (25 0600) Weight Method: Standard Scale  BMI (Calculated): 41.3  BMI Classification: obese grade III (BMI >/=40)     Ideal Body Weight (IBW), Female: 120 lb     % Ideal Body Weight, Female (lb): 200.42 %                    Usual Body Weight (UBW), k.1 kg  % Usual Body Weight: 96.66     Usual Weight Provided By: EMR weight history    Wt Readings from Last 5 Encounters:   25 109.1 kg (240 lb 8 oz) "   10/01/24 124.3 kg (274 lb)     Weight Change(s) Since Admission:  Admit Weight: 109.3 kg (241 lb) (03/31/25 1526)  4/1/2025: 109.1 kg    Patient Education    Not applicable.    Monitoring & Evaluation     Dietitian will monitor food and beverage intake, weight, electrolyte/renal panel, glucose/endocrine profile, and gastrointestinal profile.  Nutrition Risk/Follow-Up: low (follow-up in 5-7 days)  Patients assigned 'low nutrition risk' status do not qualify for a full nutritional assessment but will be monitored and re-evaluated in a 5-7 day time period. Please consult if re-evaluation needed sooner.

## 2025-04-03 NOTE — PROCEDURES
"Martine Nina is a 44 y.o. female patient.    Temp: 98.7 °F (37.1 °C) (04/03/25 1507)  Pulse: (!) 116 (04/03/25 1507)  Resp: 18 (04/03/25 1507)  BP: 118/77 (04/03/25 1507)  SpO2: 98 % (04/03/25 1507)  Weight: 109.1 kg (240 lb 8 oz) (04/01/25 0600)  Height: 5' 4" (162.6 cm) (04/01/25 0600)    PICC  Date/Time: 4/3/2025 4:54 PM  Performed by: Ethan Locke RN  Consent Done: Yes  Time out: Immediately prior to procedure a time out was called to verify the correct patient, procedure, equipment, support staff and site/side marked as required  Indications: med administration and vascular access  Anesthesia: local infiltration  Local anesthetic: lidocaine 1% without epinephrine    Preparation: skin prepped with ChloraPrep  Skin prep agent dried: skin prep agent completely dried prior to procedure  Sterile barriers: all five maximum sterile barriers used - cap, mask, sterile gown, sterile gloves, and large sterile sheet  Hand hygiene: hand hygiene performed prior to central venous catheter insertion  Location details: left brachial  Catheter type: single lumen  Catheter size: 4 Fr  Catheter Length: 17cm    Ultrasound guidance: yes  Vessel Caliber: patent, compressibility normal  Needle advanced into vessel with real time Ultrasound guidance.  Guidewire confirmed in vessel.  Sterile sheath used.  Number of attempts: 1  Post-procedure: blood return through all ports, chlorhexidine patch and sterile dressing applied            Name NATHAN Locke RN   4/3/2025    "

## 2025-04-04 ENCOUNTER — TELEPHONE (OUTPATIENT)
Facility: CLINIC | Age: 45
End: 2025-04-04
Payer: MEDICARE

## 2025-04-04 VITALS
OXYGEN SATURATION: 96 % | RESPIRATION RATE: 18 BRPM | BODY MASS INDEX: 41.06 KG/M2 | DIASTOLIC BLOOD PRESSURE: 77 MMHG | SYSTOLIC BLOOD PRESSURE: 129 MMHG | TEMPERATURE: 99 F | WEIGHT: 240.5 LBS | HEIGHT: 64 IN | HEART RATE: 76 BPM

## 2025-04-04 PROBLEM — K80.50 CHOLEDOCHOLITHIASIS: Status: ACTIVE | Noted: 2025-04-04

## 2025-04-04 LAB
BASOPHILS # BLD AUTO: 0.07 X10(3)/MCL
BASOPHILS NFR BLD AUTO: 0.5 %
EOSINOPHIL # BLD AUTO: 0.15 X10(3)/MCL (ref 0–0.9)
EOSINOPHIL NFR BLD AUTO: 1 %
ERYTHROCYTE [DISTWIDTH] IN BLOOD BY AUTOMATED COUNT: 22.4 % (ref 11.5–17)
HCT VFR BLD AUTO: 31.2 % (ref 37–47)
HGB BLD-MCNC: 10.3 G/DL (ref 12–16)
IMM GRANULOCYTES # BLD AUTO: 0.09 X10(3)/MCL (ref 0–0.04)
IMM GRANULOCYTES NFR BLD AUTO: 0.6 %
LYMPHOCYTES # BLD AUTO: 3.23 X10(3)/MCL (ref 0.6–4.6)
LYMPHOCYTES NFR BLD AUTO: 22.5 %
MCH RBC QN AUTO: 26.5 PG (ref 27–31)
MCHC RBC AUTO-ENTMCNC: 33 G/DL (ref 33–36)
MCV RBC AUTO: 80.4 FL (ref 80–94)
MONOCYTES # BLD AUTO: 0.98 X10(3)/MCL (ref 0.1–1.3)
MONOCYTES NFR BLD AUTO: 6.8 %
NEUTROPHILS # BLD AUTO: 9.85 X10(3)/MCL (ref 2.1–9.2)
NEUTROPHILS NFR BLD AUTO: 68.6 %
NRBC BLD AUTO-RTO: 0 %
PLATELET # BLD AUTO: 357 X10(3)/MCL (ref 130–400)
PMV BLD AUTO: 10.9 FL (ref 7.4–10.4)
RBC # BLD AUTO: 3.88 X10(6)/MCL (ref 4.2–5.4)
WBC # BLD AUTO: 14.37 X10(3)/MCL (ref 4.5–11.5)

## 2025-04-04 PROCEDURE — 25000003 PHARM REV CODE 250: Performed by: NURSE PRACTITIONER

## 2025-04-04 PROCEDURE — 85025 COMPLETE CBC W/AUTO DIFF WBC: CPT | Performed by: INTERNAL MEDICINE

## 2025-04-04 PROCEDURE — 36415 COLL VENOUS BLD VENIPUNCTURE: CPT | Performed by: INTERNAL MEDICINE

## 2025-04-04 PROCEDURE — 25000003 PHARM REV CODE 250: Performed by: INTERNAL MEDICINE

## 2025-04-04 PROCEDURE — 25000003 PHARM REV CODE 250: Performed by: PHYSICIAN ASSISTANT

## 2025-04-04 PROCEDURE — 63600175 PHARM REV CODE 636 W HCPCS: Performed by: INTERNAL MEDICINE

## 2025-04-04 PROCEDURE — 25000003 PHARM REV CODE 250

## 2025-04-04 RX ORDER — AMOXICILLIN AND CLAVULANATE POTASSIUM 875; 125 MG/1; MG/1
1 TABLET, FILM COATED ORAL EVERY 12 HOURS
Qty: 10 TABLET | Refills: 0 | Status: SHIPPED | OUTPATIENT
Start: 2025-04-04 | End: 2025-04-09

## 2025-04-04 RX ORDER — HYDROCHLOROTHIAZIDE 25 MG/1
25 TABLET ORAL DAILY
Start: 2025-04-04

## 2025-04-04 RX ORDER — AMOXICILLIN 250 MG
1 CAPSULE ORAL DAILY PRN
Qty: 30 TABLET | Refills: 0 | Status: SHIPPED | OUTPATIENT
Start: 2025-04-04

## 2025-04-04 RX ORDER — PANTOPRAZOLE SODIUM 20 MG/1
20 TABLET, DELAYED RELEASE ORAL DAILY
Qty: 15 TABLET | Refills: 0 | Status: SHIPPED | OUTPATIENT
Start: 2025-04-04

## 2025-04-04 RX ORDER — ONDANSETRON 4 MG/1
4 TABLET, ORALLY DISINTEGRATING ORAL EVERY 12 HOURS PRN
Qty: 20 TABLET | Refills: 0 | Status: SHIPPED | OUTPATIENT
Start: 2025-04-04

## 2025-04-04 RX ORDER — OXYCODONE HYDROCHLORIDE 5 MG/1
5 TABLET ORAL EVERY 6 HOURS PRN
Qty: 20 TABLET | Refills: 0 | Status: SHIPPED | OUTPATIENT
Start: 2025-04-04

## 2025-04-04 RX ORDER — ALUMINUM HYDROXIDE, MAGNESIUM HYDROXIDE, AND SIMETHICONE 1200; 120; 1200 MG/30ML; MG/30ML; MG/30ML
30 SUSPENSION ORAL 4 TIMES DAILY PRN
Qty: 354 ML | Refills: 0 | Status: SHIPPED | OUTPATIENT
Start: 2025-04-04

## 2025-04-04 RX ADMIN — PANTOPRAZOLE SODIUM 40 MG: 40 TABLET, DELAYED RELEASE ORAL at 05:04

## 2025-04-04 RX ADMIN — OXYCODONE HYDROCHLORIDE 5 MG: 5 TABLET ORAL at 05:04

## 2025-04-04 RX ADMIN — FLUOXETINE HYDROCHLORIDE 20 MG: 20 CAPSULE ORAL at 08:04

## 2025-04-04 RX ADMIN — LINACLOTIDE 290 MCG: 145 CAPSULE, GELATIN COATED ORAL at 05:04

## 2025-04-04 RX ADMIN — PIPERACILLIN SODIUM AND TAZOBACTAM SODIUM 4.5 G: 4; .5 INJECTION, POWDER, LYOPHILIZED, FOR SOLUTION INTRAVENOUS at 08:04

## 2025-04-04 RX ADMIN — PIPERACILLIN SODIUM AND TAZOBACTAM SODIUM 4.5 G: 4; .5 INJECTION, POWDER, LYOPHILIZED, FOR SOLUTION INTRAVENOUS at 12:04

## 2025-04-04 NOTE — OP NOTE
XI ROBOTIC CHOLECYSTECTOMY  Procedure Note    Martine Nina  4/2/2025    Pre-op Diagnosis: Choledocholithiasis [K80.50]       Post-op Diagnosis: same    Procedure(s):  XI ROBOTIC CHOLECYSTECTOMY    Surgeon(s):  Russell Avalos MD Hong, Aaron, MD    Anesthesia: General    Staff:   Circulator: Juve Pitts RN  Relief Circulator: Papito Zapien RN  Relief Scrub: Ravi Naranjo ST  Scrub Person: Arely RyancharyST    Estimated Blood Loss: 20mL               Specimens: gallbladder      Drains: 19Fr loretta drain in right upper quadrant    Operative Technique:  Risks and benefits were discussed with patient and family at bedside, informed consent signed.  Patient was taken to the OR and placed supine, endotracheal intubation was successful.  The abdomen  was then prepped and draped in sterile fashion.  A time out was completed to confirm correct patient, procedure, and all staff was in agreement.      The Veress needle was introduced through a stab incision in the left upper quadrant and the abdomen was insufflated.  A robotic 8mm port was placed periumbilically and a laparoscope was inserted into the abdomen.  No injuries were noted from entry.  An additional 8mm port was placed in the left upper quadrant and two additional 8mm ports were placed in the right abdomen.  The robot was then docked.  The gallbladder was then visualized and retracted both superiorly and laterally.  Dissection was carried out in lateral to medial fashion.  The cystic duct and cystic artery were both dissected from surrounding tissue and the critical view of safety was achieved.  On the cystic artery, two clips were placed twice proximally and one distally and then transected.  The cystic duct was quite dilated and a clip would not fit across the duct.  The robotic stapler was used to transect the cystic duct.  The gallbladder was then removed from the liver bed using the hook cautery.  This completed the robotic portion of the case.  I scrubbed  back in and placed the gallbladder into a bag and removed it and passed off the table as specimen through the periumbilical trocar port site.  Attention was then redirected to the gallbladder fossa, no active bleeding was noted.  Good hemostasis was visualized.  A 19Fr loretta drain was placed in the right upper quadrant.  All ports were then removed under direct visualization with no active bleeding noted.  The fascia at the site where the gallbladder was removed was closed with vicryl.  Skin was then closed with 4-0 mono suture.  Skin was  cleaned and dry sterile dressing was placed on the wound.  Lap and instrument  counts were correct X2 at the end of the case.  Patient tolerated the procedure well and was transferred to recovery room in good condition.     SURGEON:  Russell Avalos MD    Date: 4/2/2025  Time: 11:30 AM

## 2025-04-04 NOTE — PHYSICIAN QUERY
Please clarify the pathology findings of acute cholecystitis:  Pathology findings noted above are ruled in/confirmed as diagnoses

## 2025-04-04 NOTE — TELEPHONE ENCOUNTER
Name....: Ave  ......w/: [facility] 9C  Phone #.: [phone]  Alt/Ext.: [phone] - patient  Patient.: Martine Ravenna  Pt .: 1980  Details.: Patient is needing a follow up appoint. Patient's alt number - [phone]    Called and gave appt info about pt appt with the acute care clinic

## 2025-04-07 ENCOUNTER — PATIENT OUTREACH (OUTPATIENT)
Dept: ADMINISTRATIVE | Facility: CLINIC | Age: 45
End: 2025-04-07
Payer: MEDICARE

## 2025-04-07 NOTE — PROGRESS NOTES
C3 nurse spoke with Martine Nina for a TCC post hospital discharge follow up call. The patient has a scheduled HOSFU appointment with Mateo Brannon MD (PCP) on 4/15/25 @ 2:00pm (per AVS), and a Post-Op follow up with Acute Care Surgery Clinic on 4/8/25 @ 10:00am.

## 2025-04-08 ENCOUNTER — OFFICE VISIT (OUTPATIENT)
Dept: SURGERY | Facility: CLINIC | Age: 45
End: 2025-04-08
Payer: MEDICARE

## 2025-04-08 VITALS
OXYGEN SATURATION: 98 % | TEMPERATURE: 98 F | DIASTOLIC BLOOD PRESSURE: 97 MMHG | WEIGHT: 240.5 LBS | BODY MASS INDEX: 40.07 KG/M2 | SYSTOLIC BLOOD PRESSURE: 139 MMHG | HEIGHT: 65 IN | HEART RATE: 91 BPM

## 2025-04-08 DIAGNOSIS — K80.00 ACUTE CALCULOUS CHOLECYSTITIS: Primary | ICD-10-CM

## 2025-04-08 LAB
BACTERIA BLD CULT: NORMAL
BACTERIA BLD CULT: NORMAL

## 2025-04-08 PROCEDURE — 99024 POSTOP FOLLOW-UP VISIT: CPT | Mod: POP,,,

## 2025-04-08 NOTE — PROGRESS NOTES
Acute Care Surgery Clinic Note  Date of surgery/procedure: April 2, 2025  Operation/Procedure: robotic rome  Surgeon: Dr. Russell Avalos    Subjective:   Martine Nina is a 44 y.o. female who presents to the clinic for BRENDAN drain eval, post op eval, and pathology review. Minimal serosanguineous output from the BRENDAN drain. Pain controlled. No acute complaints. Tolerating diet. Ambulating. Normal bowel movements.  Caretaker at bedside.      Past medical history:  Past Medical History:   Diagnosis Date    Bipolar disorder, unspecified     Depression     Hypercholesteremia     Schizophrenia, unspecified      Past Medical History:   Diagnosis Date    Bipolar disorder, unspecified     Depression     Hypercholesteremia     Schizophrenia, unspecified      Past surgical history:  Past Surgical History:   Procedure Laterality Date    ERCP N/A 4/1/2025    Procedure: ERCP;  Surgeon: Ana Rosa Agosto III, MD;  Location: [facility] ENDOSCOPY;  Service: Gastroenterology;  Laterality: N/A;    ERCP W/ SPHICTEROTOMY  4/1/2025    Procedure: ERCP, WITH SPHINCTEROTOMY;  Surgeon: Ana Rosa Agosto III, MD;  Location: [facility] ENDOSCOPY;  Service: Gastroenterology;;    ERCP, WITH CALCULUS REMOVAL N/A 4/1/2025    Procedure: ERCP, WITH CALCULUS REMOVAL;  Surgeon: Ana Rosa Agosto III, MD;  Location: [facility] ENDOSCOPY;  Service: Gastroenterology;  Laterality: N/A;    ROBOT-ASSISTED CHOLECYSTECTOMY USING DA MEMO XI N/A 4/2/2025    Procedure: XI ROBOTIC CHOLECYSTECTOMY;  Surgeon: Russell Avalos MD;  Location: [facility];  Service: General;  Laterality: N/A;     Family history:  No family history on file.  Social history:  Social History     Socioeconomic History    Marital status: Single   Tobacco Use    Smoking status: Never    Smokeless tobacco: Never   Substance and Sexual Activity    Alcohol use: Never    Drug use: Never     Social Drivers of Health     Financial Resource Strain: Low Risk  (4/2/2025)    Overall Financial Resource Strain (CARDIA)      Difficulty of Paying Living Expenses: Not hard at all   Food Insecurity: No Food Insecurity (4/2/2025)    Hunger Vital Sign     Worried About Running Out of Food in the Last Year: Never true     Ran Out of Food in the Last Year: Never true   Transportation Needs: No Transportation Needs (3/31/2025)    PRAPARE - Transportation     Lack of Transportation (Medical): No     Lack of Transportation (Non-Medical): No   Physical Activity: Inactive (4/1/2025)    Exercise Vital Sign     Days of Exercise per Week: 0 days     Minutes of Exercise per Session: 0 min   Stress: No Stress Concern Present (4/2/2025)    [employer] - Occupational Stress Questionnaire     Feeling of Stress : Not at all   Housing Stability: Low Risk  (4/2/2025)    Housing Stability Vital Sign     Unable to Pay for Housing in the Last Year: No     Homeless in the Last Year: No     Tobacco Use History[1]   Social History     Substance and Sexual Activity   Alcohol Use Never      Allergies:   Review of patient's allergies indicates:   Allergen Reactions    Hydrocodone-acetaminophen      Home Meds:   Current Outpatient Medications   Medication Instructions    aluminum-magnesium hydroxide-simethicone (MAALOX) 200-200-20 mg/5 mL Susp 30 mLs, Oral, 4 times daily PRN    amoxicillin-clavulanate 875-125mg (AUGMENTIN) 875-125 mg per tablet 1 tablet, Oral, Every 12 hours    FLUoxetine 20 mg, Daily    hydroCHLOROthiazide (HYDRODIURIL) 25 mg, Oral, Daily, Defer to prescribing MD. Avoid SBP <100    linaCLOtide (LINZESS) 145 mcg, Oral, Daily PRN    OLANZapine (ZYPREXA) 20 mg, Nightly    ondansetron (ZOFRAN-ODT) 4 mg, Oral, Every 12 hours PRN    oxyCODONE (ROXICODONE) 5 mg, Oral, Every 6 hours PRN    pantoprazole (PROTONIX) 20 mg, Oral, Daily    senna-docusate (PERICOLACE) 8.6-50 mg per tablet 1 tablet, Oral, Daily PRN    Medications Ordered Prior to Encounter[2]   Current Outpatient Medications on File Prior to Visit   Medication Sig     aluminum-magnesium hydroxide-simethicone (MAALOX) 200-200-20 mg/5 mL Susp Take 30 mLs by mouth 4 (four) times daily as needed (heartburn).    amoxicillin-clavulanate 875-125mg (AUGMENTIN) 875-125 mg per tablet Take 1 tablet by mouth every 12 (twelve) hours. for 5 days    FLUoxetine 20 MG capsule Take 20 mg by mouth once daily.    hydroCHLOROthiazide (HYDRODIURIL) 25 MG tablet Take 1 tablet (25 mg total) by mouth once daily. Defer to prescribing MD. Avoid SBP <100    linaCLOtide (LINZESS) 145 mcg Cap capsule Take 1 capsule (145 mcg total) by mouth daily as needed (before breakfast for constipation).    OLANZapine (ZYPREXA) 15 MG Tab Take 20 mg by mouth every evening.    ondansetron (ZOFRAN-ODT) 4 MG TbDL Take 1 tablet (4 mg total) by mouth every 12 (twelve) hours as needed (nausea).    oxyCODONE (ROXICODONE) 5 MG immediate release tablet Take 1 tablet (5 mg total) by mouth every 6 (six) hours as needed for Pain.    pantoprazole (PROTONIX) 20 MG tablet Take 1 tablet (20 mg total) by mouth once daily.    senna-docusate (PERICOLACE) 8.6-50 mg per tablet Take 1 tablet by mouth daily as needed for Constipation (1st choice if can take PO).     No current facility-administered medications on file prior to visit.      Outpatient Medications as of 4/8/2025   Medication Sig Dispense Refill    aluminum-magnesium hydroxide-simethicone (MAALOX) 200-200-20 mg/5 mL Susp Take 30 mLs by mouth 4 (four) times daily as needed (heartburn). 354 mL 0    amoxicillin-clavulanate 875-125mg (AUGMENTIN) 875-125 mg per tablet Take 1 tablet by mouth every 12 (twelve) hours. for 5 days 10 tablet 0    FLUoxetine 20 MG capsule Take 20 mg by mouth once daily.      hydroCHLOROthiazide (HYDRODIURIL) 25 MG tablet Take 1 tablet (25 mg total) by mouth once daily. Defer to prescribing MD. Avoid SBP <100      linaCLOtide (LINZESS) 145 mcg Cap capsule Take 1 capsule (145 mcg total) by mouth daily as needed (before breakfast for constipation). 15 capsule 0     "OLANZapine (ZYPREXA) 15 MG Tab Take 20 mg by mouth every evening.      ondansetron (ZOFRAN-ODT) 4 MG TbDL Take 1 tablet (4 mg total) by mouth every 12 (twelve) hours as needed (nausea). 20 tablet 0    oxyCODONE (ROXICODONE) 5 MG immediate release tablet Take 1 tablet (5 mg total) by mouth every 6 (six) hours as needed for Pain. 20 tablet 0    pantoprazole (PROTONIX) 20 MG tablet Take 1 tablet (20 mg total) by mouth once daily. 15 tablet 0    senna-docusate (PERICOLACE) 8.6-50 mg per tablet Take 1 tablet by mouth daily as needed for Constipation (1st choice if can take PO). 30 tablet 0     No current facility-administered medications on file as of 4/8/2025.        ROS  A 12-point review of systems was performed with pertinent positives and negatives as per HPI     Objective:   Vitals:  Vitals:    04/08/25 0955   BP: (!) 139/97   Pulse: 91   Temp: 98.1 °F (36.7 °C)      Vitals:    04/08/25 0955   BP: (!) 139/97   Pulse: 91   Temp: 98.1 °F (36.7 °C)   SpO2: 98%   Weight: 109.1 kg (240 lb 8.4 oz)   Height: 5' 5" (1.651 m)       Physical Exam:  General: NAD  HEENT: Normocephalic  CV: RR  Pulm: NLB on RA   Abd: Soft, ND, NTTP, no rebound, no guarding, BRENDAN drain with serosanguineous fluid  Extremities: no edema in bilateral lower extremities   Skin:  Incisions Clean/Dry/Intact. No evidence of infection.      Pathology:  Specimen to Pathology  Order: [id number]   Status: Final result       Next appt: None       Dx: Choledocholithiasis    Test Result Released: No (inaccessible in MyChart)    0 Result Notes      Component  Ref Range & Units (hover) 6 d ago   Pathology Result    Comment:          FINAL DIAGNOSIS     GALLBLADDER, CHOLECYSTECTOMY:     ACUTE CHOLECYSTITIS WITH CHOLELITHIASIS.     Electronically Signed by:  Cristóbal Peacock M.D., P.H.D. , Pathologist  (Case signed 04/03/2025 at 10:44am)     Comment  Parts of the gallbladder wall exhibit necrosis and dystrophic calcification.     Source  GALLBLADDER     Clinical " Information  CHOLEDPCHOLITHIASIS  TRANAMINITIS  CHEST PAIN     Gross Description  Received in formalin labeled `Martine [place], gallbladder` and listed on the  requisition as `gallbladder` is and intact gallbladder specimen that measures  8.1 x 2.6 x 2.3 cm.  The free serosal surface is deep pink to red, glistening.  The hepatic bed is markedly shaggy.  No lymph nodes are grossly identified.  Sectioning reveals pale to deep-pink, glistening and trabeculated mucosa.  Wall  is slightly edematous and averages 0.4 cm in thickness.  Firmly lodged at the  fundus are multiple multifaceted green-tan calculi aggregating to 3.7 cm in  greatest dimension.  Scant red-green bile is noted.  No other gross  abnormalities are identified.  Representative sections are submitted in  cassettes as follows:  1A: Proximal margin  1B: Neck, body, and fundus  X-F-2     RJ 4/2/2025     Microscopic Description  1. Microscopic examination performed.  Please see diagnosis.         Resulting Agency [facility]             Specimen Collected: 04/02/25 11:05 CDT Last Resulted: 04/03/25 00:00 CDT     Discussed pathology findings with patient. Answered all questions.       VISIT DIAGNOSES:  No diagnosis found.    Plan:  - BRENDAN drain removed in clinic.  Gauze and tape applied to drain site.  Continue daily dressings to drain site until dry.   - ok to remove dressings to shower  - allow steri strips to fall off on their own   - ED precautions given  - Follow up PCP   - Return PRN, no scheduled appointment needed. Call for concerns.    No future appointments.       The above findings, diagnostics, and treatment plan were discussed with Dr. Russell Avalos who is in agreement with the plan of care except as stated in additional documentation.         [1]   Social History  Tobacco Use   Smoking Status Never   Smokeless Tobacco Never   [2]   Current Outpatient Medications on File Prior to Visit   Medication Sig Dispense Refill    aluminum-magnesium  hydroxide-simethicone (MAALOX) 200-200-20 mg/5 mL Susp Take 30 mLs by mouth 4 (four) times daily as needed (heartburn). 354 mL 0    amoxicillin-clavulanate 875-125mg (AUGMENTIN) 875-125 mg per tablet Take 1 tablet by mouth every 12 (twelve) hours. for 5 days 10 tablet 0    FLUoxetine 20 MG capsule Take 20 mg by mouth once daily.      hydroCHLOROthiazide (HYDRODIURIL) 25 MG tablet Take 1 tablet (25 mg total) by mouth once daily. Defer to prescribing MD. Avoid SBP <100      linaCLOtide (LINZESS) 145 mcg Cap capsule Take 1 capsule (145 mcg total) by mouth daily as needed (before breakfast for constipation). 15 capsule 0    OLANZapine (ZYPREXA) 15 MG Tab Take 20 mg by mouth every evening.      ondansetron (ZOFRAN-ODT) 4 MG TbDL Take 1 tablet (4 mg total) by mouth every 12 (twelve) hours as needed (nausea). 20 tablet 0    oxyCODONE (ROXICODONE) 5 MG immediate release tablet Take 1 tablet (5 mg total) by mouth every 6 (six) hours as needed for Pain. 20 tablet 0    pantoprazole (PROTONIX) 20 MG tablet Take 1 tablet (20 mg total) by mouth once daily. 15 tablet 0    senna-docusate (PERICOLACE) 8.6-50 mg per tablet Take 1 tablet by mouth daily as needed for Constipation (1st choice if can take PO). 30 tablet 0     No current facility-administered medications on file prior to visit.

## 2025-04-10 NOTE — DISCHARGE SUMMARY
"[facility] Medicine Discharge Summary    Admit Date: 3/31/2025  Discharge Date and Time: 4/4/25  Admitting Physician:  Team  Discharging Physician: Ashanti Bonilla MD.  Primary Care Physician: Mateo Brannon MD  Consults: Gastroenterology and General Surgery    Discharge Diagnoses:  Abdominal pain   Choledocholithiasis  s/p ERCP, sphincterotomy, balloon extraction 4/1, now s/p lap rome 4/2 w/ BRENDAN placement  Schizophrenia/Bipolar disorder    Hospital Course:   44 yr old female whose history includes Bipolar disorder, schizophrenia and dyslipidemia. At the time of md's exam patient is AAO x 3. Appears comfortable. Her personal home sitter at bedside. Reported having abdominal pain 2 weeks ago which lasted about a week prior to resolving but returned again . Outpatient labs and CT abdomen/pelvis ordered by PCP which revealed a significant transaminitis and stones in CBD. She was instructed to present to ED for further evaluation. Endorses vomiting after eating a "heavy" meal and a 17 pound weight loss since 3/6/25.     CT abdomen and pelvis with and without contrast showed mild hepatomegaly with dilated intrahepatic and extrahepatic biliary ducts with suspected 2-3 mm stones of the distal common bile duct/pancreatic head, cholelithiasis, moderate-size hiatal hernia with mucosal thickening and findings of constipation with dense feces at the left-sided the colon including a dense fecal bolus at the rectum measuring up to 7 cm in diameter.     VS on arrival: T 98.2, P 74, R 20, B/P 140/93, sats 99% on room air. Initial labs include WBC 13.9 (appears chronic), , , .  Abdominal ultrasound shows cholelithiasis with a positive sonographic Pearl's sign but no significant ascites or gallbladder wall thickening.  Common bile duct not confidently visualized on this exam.  Mild hepatomegaly.MRCP shows cholelithiasis without cholecystitis.  Also noted intra and " extrahepatic biliary duct dilation with CBD filling defect     Gastroenterology and General surgery on board.  Status post ERCP.sphincterotomy, balloon extraction 4/1.  status post lap rome by surgery.  Surgery has cleared for discharge.  Noted leukocytosis, started antibiotics, sent Blood cultures-negative at 24 hours, transition to oral antibiotics and eventually discharged    Pt was seen and examined on the day of discharge.Patient was stable on discharge,all questions were answered and emphasized the importance of follow ups.    Vitals:  VITAL SIGNS: 24 HRS MIN & MAX LAST   No data recorded 98.7 °F (37.1 °C)   No data recorded 129/77   No data recorded  76   No data recorded 18   No data recorded 96 %       Physical Exam:  General: In no acute distress, afebrile, obese  Chest: Clear to auscultation bilaterally  Heart:  +S1, S2, normal rate  Abdomen: Soft, nontender, BS +  MSK: Warm, no lower extremity edema, no clubbing or cyanosis  Neurologic: Alert and oriented     Procedures Performed:  See full chart, status post ERCP.sphincterotomy, balloon extraction 4/1.  Status post lap rome    Significant Diagnostic Studies: See Full reports for all details    Recent Labs   Lab 04/03/25  0608 04/04/25  0604   WBC 19.12* 14.37*   RBC 4.42 3.88*   HGB 11.9* 10.3*   HCT 36.6* 31.2*   MCV 82.8 80.4   MCH 26.9* 26.5*   MCHC 32.5* 33.0   RDW 22.4* 22.4*   * 357   MPV 10.8* 10.9*       Recent Labs   Lab 04/03/25  0608      K 3.9      CO2 26   BUN 7.8   CREATININE 0.72   CALCIUM 9.0   ALBUMIN 2.9*   ALKPHOS 487*   *   *   BILITOT 1.0        Microbiology Results (last 7 days)       ** No results found for the last 168 hours. **             Mammo Digital Screening Bilat w/ Ronnie (XPD)   - MAMMO DIGITAL SCREENING BILAT WITH RONNIE (XPD)    BILATERAL DIGITAL SCREENING MAMMOGRAM 3D/2D WITH CAD: 3/31/2025    HISTORY: Routine screening. Patient has no complaints.      COMPARISONS: Comparison is made to  exams dated:  3/28/2024 mammogram and 2/9/2023 mammogram - [facility].      TECHNIQUE: Digital mammography views were performed with tomosynthesis. Current study was evaluated with a Computer Aided Detection (CAD) system.     BREAST COMPOSITION: There are scattered areas of fibroglandular density.      FINDINGS:   No significant masses, calcifications, or other findings are seen in either breast.    There has been no significant interval change.    IMPRESSION: NEGATIVE    There is no mammographic evidence of malignancy.     RECOMMENDATIONS:   A routine screening mammogram in one year in the absence of significant clinical findings in the interval is recommended (April 2026).      Veda watt/penrad:4/3/2025 12:12:10      letter sent: Mammography Normal    Mammogram BI-RADS: 1 Negative         Medication List        START taking these medications      aluminum-magnesium hydroxide-simethicone 200-200-20 mg/5 mL Susp  Commonly known as: MAALOX  Take 30 mLs by mouth 4 (four) times daily as needed (heartburn).     amoxicillin-clavulanate 875-125mg 875-125 mg per tablet  Commonly known as: AUGMENTIN  Take 1 tablet by mouth every 12 (twelve) hours. for 5 days     linaCLOtide 145 mcg Cap capsule  Commonly known as: LINZESS  Take 1 capsule (145 mcg total) by mouth daily as needed (before breakfast for constipation).     ondansetron 4 MG Tbdl  Commonly known as: ZOFRAN-ODT  Take 1 tablet (4 mg total) by mouth every 12 (twelve) hours as needed (nausea).     oxyCODONE 5 MG immediate release tablet  Commonly known as: ROXICODONE  Take 1 tablet (5 mg total) by mouth every 6 (six) hours as needed for Pain.     pantoprazole 20 MG tablet  Commonly known as: PROTONIX  Take 1 tablet (20 mg total) by mouth once daily.     senna-docusate 8.6-50 mg per tablet  Commonly known as: PERICOLACE  Take 1 tablet by mouth daily as needed for Constipation (1st choice if can take PO).            CHANGE  how you take these medications      hydroCHLOROthiazide 25 MG tablet  Commonly known as: HYDRODIURIL  Take 1 tablet (25 mg total) by mouth once daily. Defer to prescribing MD. Avoid SBP <100  What changed:   when to take this  additional instructions            CONTINUE taking these medications      FLUoxetine 20 MG capsule     OLANZapine 15 MG Tab  Commonly known as: ZyPREXA            STOP taking these medications      pravastatin 20 MG tablet  Commonly known as: PRAVACHOL               Where to Get Your Medications        These medications were sent to [id number] - [place] - [address] AT [facility] [address]  [address], [place] [address]      Phone: [phone]   aluminum-magnesium hydroxide-simethicone 200-200-20 mg/5 mL Susp  amoxicillin-clavulanate 875-125mg 875-125 mg per tablet  linaCLOtide 145 mcg Cap capsule  ondansetron 4 MG Tbdl  oxyCODONE 5 MG immediate release tablet  pantoprazole 20 MG tablet  senna-docusate 8.6-50 mg per tablet       Information about where to get these medications is not yet available    Ask your nurse or doctor about these medications  hydroCHLOROthiazide 25 MG tablet          Explained in detail to the patient about the discharge plan, medications, and follow-up visits. Pt understands and agrees with the treatment plan  Discharge Disposition: Home or Self Care   Discharged Condition: stable  Diet-    Medications Per DC med rec  Activities as tolerated   Follow-up Information       Clinic, Acute Care Surgery. Go on 4/8/2025.    Why: @ 10:00 AM  Contact information:  [address]  [place] [address]  [phone]               Mateo Brannon MD. Go on 4/15/2025.    Specialty: Family Medicine  Why: @ 2:00 PM  Contact information:  [address]  [address]  [place] [address]  [phone]               Brandt Flores MD Follow up.    Specialty: Gastroenterology  Why: Office will call you with an appointment  date and time  Contact information:  [facility] [facility] [address]  [facility] [address]  [phone]                           For further questions contact hospitalist office    Discharge time 33 minutes    For worsening symptoms, chest pain, shortness of breath, increased abdominal pain, high grade fever, stroke or stroke like symptoms, immediately go to the nearest Emergency Room or call 911 as soon as possible.      Ashanti Nguyen M.D, on 4/9/2025. at 7:32 PM.

## (undated) DEVICE — BAG SPEC RETRV ENDO 4X6IN DISP

## (undated) DEVICE — COVER TIP CURVED SCISSORS XI

## (undated) DEVICE — BLADE SURG STAINLESS STEEL #11

## (undated) DEVICE — HOLDER STRIP-T SELF ADH 2X10IN

## (undated) DEVICE — COVER MAYO STND XL 30X57IN

## (undated) DEVICE — SUT MCRYL PLUS 4-0 PS2 27IN

## (undated) DEVICE — APPLICATOR CHLORAPREP ORN 26ML

## (undated) DEVICE — SEAL CANN UNIVERSAL 5-12MM

## (undated) DEVICE — EXTRACTOR PRO 9-12MM ABOVE

## (undated) DEVICE — STRIP MEDI WND CLSR 1/2X4IN

## (undated) DEVICE — KIT SURGICAL COLON .25 1.1OZ

## (undated) DEVICE — DRAPE ARM DAVINCI XI

## (undated) DEVICE — ELECTRODE PATIENT RETURN DISP

## (undated) DEVICE — COLLECTION SPECIMEN NEPTUNE

## (undated) DEVICE — SUPPORT ULNA NERVE PROTECTOR

## (undated) DEVICE — SPHINCTEROTOME ST 5MMX.035IN

## (undated) DEVICE — ELECTRODE REM PLYHSV RETURN 9

## (undated) DEVICE — IRRIGATOR ENDOWRIST XI SUCTION

## (undated) DEVICE — GLOVE SIGNATURE ESSNTL LTX 7.5

## (undated) DEVICE — BLOCK BLOX BITE DENT RIM 54FR

## (undated) DEVICE — OBTURATOR BLADELESS 8MM XI CLR

## (undated) DEVICE — TROCAR KII FIOS ZTHREAD 11X100

## (undated) DEVICE — SOL CLEARIFY VISUALIZATION LAP

## (undated) DEVICE — GUIDEWIRE ENDO .025FR 260X5CM

## (undated) DEVICE — SOL IRRI STRL WATER 1000ML

## (undated) DEVICE — DEVICE LOCKING RX - OLYMPUS

## (undated) DEVICE — DRAPE COLUMN DAVINCI XI

## (undated) DEVICE — TIP SUCTION YANKAUER

## (undated) DEVICE — CLIP HEMO-LOK ML

## (undated) DEVICE — SYR 10CC LUER LOCK

## (undated) DEVICE — GLOVE PROTEXIS BLUE LATEX 7.5

## (undated) DEVICE — KIT SURGICAL TURNOVER

## (undated) DEVICE — TRAY CATH 1-LYR URIMTR 16FR

## (undated) DEVICE — NDL INSUF ULTRA VERESS 120MM

## (undated) DEVICE — STAPLER SUREFORM SGL USE 45

## (undated) DEVICE — SUT VICRYL+ 27 UR-6 VIOL

## (undated) DEVICE — NDL SAFETY 22G X 1.5 ECLIPSE

## (undated) DEVICE — KIT GEN LAPAROSCOPY LAFAYETTE

## (undated) DEVICE — TUBING INSUFFLATOR W/ROT CONCT